# Patient Record
Sex: FEMALE | Race: WHITE | NOT HISPANIC OR LATINO | Employment: PART TIME | URBAN - METROPOLITAN AREA
[De-identification: names, ages, dates, MRNs, and addresses within clinical notes are randomized per-mention and may not be internally consistent; named-entity substitution may affect disease eponyms.]

---

## 2023-11-15 DIAGNOSIS — M25.552 LEFT HIP PAIN: ICD-10-CM

## 2023-12-15 ENCOUNTER — HOSPITAL ENCOUNTER (OUTPATIENT)
Dept: RADIOLOGY | Facility: HOSPITAL | Age: 18
Discharge: HOME | End: 2023-12-15
Payer: COMMERCIAL

## 2023-12-15 DIAGNOSIS — M25.552 LEFT HIP PAIN: ICD-10-CM

## 2023-12-15 PROCEDURE — 73503 X-RAY EXAM HIP UNI 4/> VIEWS: CPT | Mod: LT

## 2023-12-15 PROCEDURE — 73503 X-RAY EXAM HIP UNI 4/> VIEWS: CPT | Mod: LEFT SIDE | Performed by: RADIOLOGY

## 2024-01-18 DIAGNOSIS — M25.859 FEMORAL ACETABULAR IMPINGEMENT: ICD-10-CM

## 2024-01-22 ENCOUNTER — APPOINTMENT (OUTPATIENT)
Dept: RADIOLOGY | Facility: HOSPITAL | Age: 19
End: 2024-01-22
Payer: COMMERCIAL

## 2024-01-22 ENCOUNTER — HOSPITAL ENCOUNTER (OUTPATIENT)
Dept: RADIOLOGY | Facility: CLINIC | Age: 19
Discharge: HOME | End: 2024-01-22
Payer: COMMERCIAL

## 2024-01-22 DIAGNOSIS — M25.859 FEMORAL ACETABULAR IMPINGEMENT: ICD-10-CM

## 2024-01-22 PROCEDURE — 73721 MRI JNT OF LWR EXTRE W/O DYE: CPT | Mod: LT

## 2024-01-22 PROCEDURE — 73721 MRI JNT OF LWR EXTRE W/O DYE: CPT | Mod: LEFT SIDE | Performed by: RADIOLOGY

## 2024-08-05 ENCOUNTER — OFFICE VISIT (OUTPATIENT)
Dept: ORTHOPEDIC SURGERY | Facility: HOSPITAL | Age: 19
End: 2024-08-05
Payer: COMMERCIAL

## 2024-08-05 ENCOUNTER — HOSPITAL ENCOUNTER (OUTPATIENT)
Dept: RADIOLOGY | Facility: HOSPITAL | Age: 19
Discharge: HOME | End: 2024-08-05
Payer: COMMERCIAL

## 2024-08-05 DIAGNOSIS — M25.551 BILATERAL HIP PAIN: ICD-10-CM

## 2024-08-05 DIAGNOSIS — M25.552 PAIN OF LEFT HIP: Primary | ICD-10-CM

## 2024-08-05 DIAGNOSIS — M25.552 BILATERAL HIP PAIN: ICD-10-CM

## 2024-08-05 PROCEDURE — 73523 X-RAY EXAM HIPS BI 5/> VIEWS: CPT | Mod: BILATERAL PROCEDURE | Performed by: RADIOLOGY

## 2024-08-05 PROCEDURE — 99214 OFFICE O/P EST MOD 30 MIN: CPT | Performed by: ORTHOPAEDIC SURGERY

## 2024-08-05 PROCEDURE — 99204 OFFICE O/P NEW MOD 45 MIN: CPT | Performed by: ORTHOPAEDIC SURGERY

## 2024-08-05 PROCEDURE — 73523 X-RAY EXAM HIPS BI 5/> VIEWS: CPT

## 2024-08-05 RX ORDER — ALBUTEROL SULFATE 90 UG/1
2 INHALANT RESPIRATORY (INHALATION) EVERY 6 HOURS PRN
COMMUNITY

## 2024-08-05 RX ORDER — MELOXICAM 15 MG/1
15 TABLET ORAL DAILY
Qty: 14 TABLET | Refills: 0 | Status: SHIPPED | OUTPATIENT
Start: 2024-08-05 | End: 2025-08-05

## 2024-08-05 ASSESSMENT — PAIN - FUNCTIONAL ASSESSMENT: PAIN_FUNCTIONAL_ASSESSMENT: 0-10

## 2024-08-05 ASSESSMENT — PAIN SCALES - GENERAL: PAINLEVEL_OUTOF10: 3

## 2024-08-05 ASSESSMENT — PAIN DESCRIPTION - DESCRIPTORS: DESCRIPTORS: NUMBNESS;ACHING

## 2024-08-05 NOTE — PROGRESS NOTES
Subjective   Justina Vela is a 19 y.o. female [employer]  (senior year) who presents for Pain of the Left Hip    Chronic left hip problem since high school. Left leg is her plant leg. Had treatment with chiropractor. Worse with cutting, internal and external rotation. She reports continue to worsen in which last season toward the end, she notes painful during the game and after just walking.  and physical therapy worked with her with TOBI strengthening and hip mobility for over 1 year.  Have not done any injection into hip.  Taking advil 400mg one to two times a week as needed.  Groin hip pain with c sign radiating down front quad to knees. Worse with cutting, agility, internal/external rotation, prolonged sitting.  Denies injury, trauma, numbness.  Endorses numbness in the front quadricep    She saw Maria De Jesus 11/2023. Diagnosed with TOBI.     Patient is hoping to get through the season (Fall). Start 8/17.    ROS: All pertinent positive symptoms are included in the history of present illness.    All other systems have been reviewed and are negative and noncontributory to this patient's current ailments.    Objective     There were no vitals filed for this visit.    General/Constitutional: No apparent distress. Well-nourished and well developed.  Head: Normocephalic, Atraumatic.   Eyes: EOMI.  Vascular: No edema, swelling or tenderness, except as noted in detailed exam.  Respiratory: Non-labored breathing.  Integumentary: No impressive skin lesions present, except as noted in detailed exam.  Neurological: Alert and oriented.  Psychological:  Normal mood and affect.  Musculoskeletal: Normal, except as noted in detailed exam.    Left HIP EXAM    Inspection:   Normal   Obliquity none   Muscle atrophy none    Range of motion:   Hip flexion supine: (140) full, pain free   Hip extension (prone) (15) full, pain free   Hip abduction (45) full, pain free   Hip adduction (30-45) full, pain free   Hip  IR at 90 flexion (15) full, pain free   Hip ER at 90 Flexion(40-50) full, pain free     Palpation:   TTP ASIS none  TTP AIIS none  TTP Greater trochanter none  TTP Ischial tuberosities none  TTP Iliac crest none  TTP Femur none  TTP Anterior hip joint line pain on L    TTP Flexor tendons none  TTP Gluteus medius tendon none  TTP Tensor fascia meche none  TTP Adductors none  TTP Quadriceps none  TTP Hamstring none  TTP Piriformis none  TTP Gluteus musculature none    TTP SI joint none  TTP Midline lumbar spine none  TTP Lumbar paraspinal muscles none    Special Tests:  CORA (psoas impingement): negative  Internal impingement: FADIR: positive  Log roll: negative  Resisted straight leg raise: no pain    Strength test:   Seated hip flexion pain free, 5/5  Extension pain free, 5/5  Abduction pain free, 5/5  Adduction pain free, 5/5  Side-lying hip abduction pain free, 5/5  Supine resisted straight leg raise pain free, 5/5    Gait normal     Imagin2024 bilateral hip reveal no acute fracture or dislocation. Cam deformity with alpha angle of 68 bilaterally. Left hip inclination 4.7, LCA 27, mild posterior and lateral uncoverage   2024 MRI left hip: Anterior acetabular labral tear. Small amount of gluteus medius and left hamstring origin tendinosis    === 12/15/23 ===  XR HIP LEFT WITH PELVIS WHEN PERFORMED 4+ VIEWS  - Impression -  Cam morphology of the left femoral neck.  Signed by: Declan Murdock 2023 9:01 AM    Assessment/Plan   Problem List Items Addressed This Visit    None  Visit Diagnoses       Pain of left hip    -  Primary    Relevant Medications    meloxicam (Mobic) 15 mg tablet    Other Relevant Orders    XR hips bilateral 5+ VW w pelvis when performed          Justina Vela is a 19 y.o. 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 Patient reports continued worsening (cutting, agility, internal/external rotation, prolonged sitting) of her left hip pain  despite working with athletic trainers on strengthening and mobility.  Physical exam revealed positive FADIR and pain to palpation anterior hip joint line.  Imaging x-ray reveal bilateral cam deformity and MRI revealing anterior acetabular labral tear.  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    Discussed and educated regarding TOBI diagnosis.  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  With shared decision making,  - Meloxicam once daily for 14 days  - Continue physical therapy and activity modifications  - Discussed with [facility] team physicians (Dr. Maharaj) to coordinate L intra-articular hip injection when needed  - If continue to worsen/persist, consider MRI for R hip arthroscopy     Follow up as needed.

## 2024-08-26 ENCOUNTER — APPOINTMENT (OUTPATIENT)
Dept: ORTHOPEDIC SURGERY | Facility: CLINIC | Age: 19
End: 2024-08-26
Payer: COMMERCIAL

## 2024-08-26 ENCOUNTER — HOSPITAL ENCOUNTER (OUTPATIENT)
Dept: RADIOLOGY | Facility: EXTERNAL LOCATION | Age: 19
Discharge: HOME | End: 2024-08-26

## 2024-08-26 DIAGNOSIS — M25.552 PAIN OF LEFT HIP: ICD-10-CM

## 2024-08-26 DIAGNOSIS — M25.551 RIGHT HIP PAIN: Primary | ICD-10-CM

## 2024-08-26 PROCEDURE — 99213 OFFICE O/P EST LOW 20 MIN: CPT | Performed by: FAMILY MEDICINE

## 2024-08-26 PROCEDURE — 20611 DRAIN/INJ JOINT/BURSA W/US: CPT | Performed by: STUDENT IN AN ORGANIZED HEALTH CARE EDUCATION/TRAINING PROGRAM

## 2024-08-26 PROCEDURE — 1036F TOBACCO NON-USER: CPT | Performed by: FAMILY MEDICINE

## 2024-08-26 ASSESSMENT — PAIN - FUNCTIONAL ASSESSMENT: PAIN_FUNCTIONAL_ASSESSMENT: 0-10

## 2024-08-26 ASSESSMENT — PAIN DESCRIPTION - DESCRIPTORS: DESCRIPTORS: ACHING;SHARP

## 2024-08-26 NOTE — PROGRESS NOTES
Subjective   Justina Vela is a 19 y.o. female [employer] Ruff[employer]  (senior year) who presents for Pain of the Left Hip and Pain of the Right Hip    8/5 Visit : Chronic left hip problem since high school. Left leg is her plant leg. Had treatment with chiropractor. Worse with cutting, internal and external rotation. She reports continue to worsen in which last season toward the end, she notes painful during the game and after just walking.  and physical therapy worked with her with TOBI strengthening and hip mobility for over 1 year.  Have not done any injection into hip.  Taking advil 400mg one to two times a week as needed.  Groin hip pain with c sign radiating down front quad to knees. Worse with cutting, agility, internal/external rotation, prolonged sitting.  Denies injury, trauma, numbness.  Endorses numbness in the front quadricep  She saw Dr. Yoo 11/2023. Diagnosed with TOBI.   Patient is hoping to get through the season (Fall). Start 8/17.  ROS: All pertinent positive symptoms are included in the history of present illness.  All other systems have been reviewed and are negative and noncontributory to this patient's current ailments.    8/26 - Return to clinic for intraarticular injections of both hips. Pain L>R, no new injuries/events, has been compensating with R leg some and requesting since she is here and having likely TOBI pain bilaterally.    Objective     There were no vitals filed for this visit.    General/Constitutional: No apparent distress. Well-nourished and well developed.  Head: Normocephalic, Atraumatic.   Eyes: EOMI.  Vascular: No edema, swelling or tenderness, except as noted in detailed exam.  Respiratory: Non-labored breathing.  Integumentary: No impressive skin lesions present, except as noted in detailed exam.  Neurological: Alert and oriented.  Psychological:  Normal mood and affect.  Musculoskeletal: Normal, except as noted in detailed exam.    Left HIP  EXAM  Inspection:   Normal   Obliquity none   Muscle atrophy none    Range of motion:   Hip flexion supine: (140) full, pain free   Hip extension (prone) (15) full, pain free   Hip abduction (45) full, pain free   Hip adduction (30-45) full, pain free   Hip IR at 90 flexion (15) full, pain free   Hip ER at 90 Flexion(40-50) full, pain free     Palpation:   TTP ASIS none  TTP AIIS none  TTP Greater trochanter none  TTP Ischial tuberosities none  TTP Iliac crest none  TTP Femur none  TTP Anterior hip joint line pain on L    TTP Flexor tendons none  TTP Gluteus medius tendon none  TTP Tensor fascia meche none  TTP Adductors none  TTP Quadriceps none  TTP Hamstring none  TTP Piriformis none  TTP Gluteus musculature none    TTP SI joint none  TTP Midline lumbar spine none  TTP Lumbar paraspinal muscles none    Special Tests:  CORA (psoas impingement): negative  Internal impingement: FADIR: positive  Log roll: negative  Resisted straight leg raise: no pain    Strength test:   Seated hip flexion pain free, 5/5  Extension pain free, 5/5  Abduction pain free, 5/5  Adduction pain free, 5/5  Side-lying hip abduction pain free, 5/5  Supine resisted straight leg raise pain free, 5/5    Gait normal     Imagin2024 bilateral hip reveal no acute fracture or dislocation. Cam deformity with alpha angle of 68 bilaterally. Left hip inclination 4.7, LCA 27, mild posterior and lateral uncoverage   2024 MRI left hip: Anterior acetabular labral tear. Small amount of gluteus medius and left hamstring origin tendinosis    === 12/15/23 ===  XR HIP LEFT WITH PELVIS WHEN PERFORMED 4+ VIEWS  - Impression -  Cam morphology of the left femoral neck.  Signed by: Declan Murdock 2023 9:01 AM    PROCEDURES  L Inj/Asp: bilateral hip joint on 2024 3:58 PM  Indications: pain  Details: 20 G needle, ultrasound-guided anterior approach  Medications (Right): 80 mg methylPREDNISolone acetate 40 mg/mL; 2 mL lidocaine 10 mg/mL (1  %)  Medications (Left): 2 mL lidocaine 10 mg/mL (1 %); 80 mg methylPREDNISolone acetate 40 mg/mL  Outcome: tolerated well, no immediate complications  Procedure, treatment alternatives, risks and benefits explained, specific risks discussed. Consent was given by the patient. Immediately prior to procedure a time out was called to verify the correct patient, procedure, equipment, support staff and site/side marked as required. Patient was prepped and draped in the usual sterile fashion.            Assessment/Plan   Problem List Items Addressed This Visit    None  Visit Diagnoses       Right hip pain    -  Primary    Relevant Orders    L Inj/Asp: bilateral hip joint    Pain of left hip        Relevant Orders    Point of Care Ultrasound (Completed)    L Inj/Asp: bilateral hip joint          Justina Vela is a 19 y.o. 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 Patient reports continued worsening (cutting, agility, internal/external rotation, prolonged sitting) of her left hip pain despite working with athletic trainers on strengthening and mobility.  Physical exam revealed positive FADIR and pain to palpation anterior hip joint line.  Imaging x-ray reveal bilateral cam deformity and MRI revealing anterior acetabular labral tear.  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    Today's plan: bilateral CSI injection for symptomatic relief to get through senior season.  Follow up as needed.  If continue to worsen/persist, consider MRI for R hip arthroscopy     All questions answered and patient is agreeable to plan of care.    Diony Taylor MD PGY-4  Primary Care Sports Medicine Fellow  [facility] [place]  [facility]

## 2024-08-27 RX ORDER — LIDOCAINE HYDROCHLORIDE 10 MG/ML
2 INJECTION INFILTRATION; PERINEURAL
Status: COMPLETED | OUTPATIENT
Start: 2024-08-26 | End: 2024-08-26

## 2024-08-27 RX ORDER — METHYLPREDNISOLONE ACETATE 40 MG/ML
80 INJECTION, SUSPENSION INTRA-ARTICULAR; INTRALESIONAL; INTRAMUSCULAR; SOFT TISSUE
Status: COMPLETED | OUTPATIENT
Start: 2024-08-26 | End: 2024-08-26

## 2024-09-16 DIAGNOSIS — M25.572 LEFT ANKLE PAIN, UNSPECIFIED CHRONICITY: ICD-10-CM

## 2024-09-17 ENCOUNTER — HOSPITAL ENCOUNTER (OUTPATIENT)
Dept: RADIOLOGY | Facility: CLINIC | Age: 19
Discharge: HOME | End: 2024-09-17
Payer: COMMERCIAL

## 2024-09-17 DIAGNOSIS — M25.572 LEFT ANKLE PAIN, UNSPECIFIED CHRONICITY: ICD-10-CM

## 2024-09-17 PROCEDURE — 73610 X-RAY EXAM OF ANKLE: CPT | Mod: LEFT SIDE | Performed by: RADIOLOGY

## 2024-09-17 PROCEDURE — 73610 X-RAY EXAM OF ANKLE: CPT | Mod: LT

## 2024-10-30 ENCOUNTER — OFFICE VISIT (OUTPATIENT)
Dept: ORTHOPEDIC SURGERY | Facility: HOSPITAL | Age: 19
End: 2024-10-30
Payer: COMMERCIAL

## 2024-10-30 DIAGNOSIS — Q65.89 HIP DYSPLASIA (HHS-HCC): Primary | ICD-10-CM

## 2024-10-30 PROCEDURE — 99213 OFFICE O/P EST LOW 20 MIN: CPT | Performed by: ORTHOPAEDIC SURGERY

## 2024-11-06 ENCOUNTER — HOSPITAL ENCOUNTER (OUTPATIENT)
Dept: RADIOLOGY | Facility: HOSPITAL | Age: 19
Discharge: HOME | End: 2024-11-06
Payer: COMMERCIAL

## 2024-11-06 DIAGNOSIS — Q65.89 HIP DYSPLASIA (HHS-HCC): ICD-10-CM

## 2024-11-06 PROCEDURE — 72192 CT PELVIS W/O DYE: CPT

## 2024-11-13 ENCOUNTER — APPOINTMENT (OUTPATIENT)
Dept: RADIOLOGY | Facility: CLINIC | Age: 19
End: 2024-11-13
Payer: COMMERCIAL

## 2024-11-20 ENCOUNTER — OFFICE VISIT (OUTPATIENT)
Dept: ORTHOPEDIC SURGERY | Facility: HOSPITAL | Age: 19
End: 2024-11-20
Payer: COMMERCIAL

## 2024-11-20 ENCOUNTER — OFFICE VISIT (OUTPATIENT)
Dept: ORTHOPEDIC SURGERY | Facility: CLINIC | Age: 19
End: 2024-11-20
Payer: COMMERCIAL

## 2024-11-20 DIAGNOSIS — Q65.89 HIP DYSPLASIA (HHS-HCC): Primary | ICD-10-CM

## 2024-11-20 PROCEDURE — 99213 OFFICE O/P EST LOW 20 MIN: CPT | Performed by: ORTHOPAEDIC SURGERY

## 2024-11-20 PROCEDURE — 99215 OFFICE O/P EST HI 40 MIN: CPT | Performed by: ORTHOPAEDIC SURGERY

## 2024-11-20 PROCEDURE — 1036F TOBACCO NON-USER: CPT | Performed by: ORTHOPAEDIC SURGERY

## 2024-11-20 ASSESSMENT — PAIN - FUNCTIONAL ASSESSMENT: PAIN_FUNCTIONAL_ASSESSMENT: NO/DENIES PAIN

## 2024-11-20 NOTE — PROGRESS NOTES
This is a consultation from Dr. Hansen.  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  She has had left hip pain now for several months up to a year.  She has been playing through it over the past season.  Her pain is worse activity and better with rest and now is even present with just mild activity.  She is done physical therapy and training with her  without relief of her symptoms.  She notes mostly anterior and anterolateral pain.    The patients full medical history, surgical history, medications, allergies, family, medical history, social history, and a complete 30 point review of systems is documented in the medical record on the signed, scanned medical intake sheet or reviewed in the history of present illness.    Gen: The patient is alert and oriented ×3, is in no acute distress, and appear their stated age and weight.    Psychiatric: Mood and affect are appropriate.    Eyes: Sclera are white, and pupils are round and symmetric.    ENT: Mucous membranes are moist.     Neck: Supple. Thyroid is midline.    Respiratory: Respirations are nonlabored, chest rise is symmetric.    Cardiac: Rate is regular by palpation of distal pulses.     Abdomen: Nondistended.    Integument: No obvious cutaneous lesions are noted. No signs of lymphangitis. No signs of systemic edema.    Gait and stance examination demonstrate a reciprocal heel toe gait. Trendelenburg sign is negative.    Left/right hip examination reveals 120 degrees of flexion, 30 degrees of internal rotation, 50 degrees of external rotation, and 50 degrees of abduction. Anterior impingement sign is positive.  Posterior impingement sign is negative. Subspine impingement sign is negative. CORA test is negative. Stinchfield test is positive. Ramírez test is negative. There is no tenderness to palpation over the pubic symphysis, anterior groin, greater trochanter, or gluteal  region. Posterior apprehension is positive. Anterior apprehension is negative. Hip flexion strength is 5 out of 5. Adductor strength is 5 out of 5. Abduction strength is 5 out of 5 in the lateral position. Pelvic obliquity is level.    Distally, ankle dorsiflexion and plantarflexion as well as great toe extension is 5 out of 5. Sensation is intact to light touch in the tibial, sural, saphenous, superficial peroneal, and deep peroneal nerve distributions. The foot is warm and well-perfused with palpable pedal pulses. There is no obvious edema present. Skin is warm, dry and intact.    Multiple views of her hip and pelvis demonstrate a 20 degree lateral center edge angle on a flat acetabular index.  MRI demonstrates labral tear.    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  She would be a good candidate for combined ROLF and hip arthroscopy.  She would like to move forward with surgery in the spring.  Will get her scheduled for 3/10.  We had a detailed discussion regarding the full details of the procedure, the proposed surgical plan, the cutting of the acetabular socket free from the intact pelvis and reorientation. We discussed combined arthroscopic and open approach with Dr. Hansen for decompression of her femoral head with osteochondroplasty and repair of her labrum. We discussed regional anesthesia block combined with general anesthesia. We discussed use of hypotensive anesthesia to minimize blood loss but that blood allogenic blood transfusion is still a possibility. We discussed 3-4 day inpatient hospital stay. Although a traditional hip arthroscopy would be done outpatient, the complex level of hospital and nursing care needed would necessitate an inpatient stay, including frequent vital checks, monitoring and treatment of hypotension and anemia, IV pain medication, medical co-management, and safety education of ADLs while on crutches with limited weight bearing.  We  also discussed 30 pounds foot flat weightbearing on the operative extremity with crutch use for 6 weeks. We discussed that patient will need a wheelchair for short distances possibly for up to 3 months and long distances up to 6 months depending on fatigue and recovery level. The natural history of dysplasia as well as details surrounding the procedure and postoperative recovery course were discussed with the patient and family present.  Nonoperative and operative treatment options were presented to the patient. After discussion, operative treatment was elected. Risks and benefits of surgery were discussed with the patient which include, but are not limited to, death, infection, bleeding, neurologic damage, nonunion, malunion, posttraumatic arthritis, incomplete resolution of symptoms, failure of the operation, and others. The patient understood and elected to proceed.    Natural History reviewed. All questions answered. The patient was in agreement with the plan.      **This note was created using voice recognition software and was not corrected for typographical or grammatical errors.**

## 2024-11-20 NOTE — PROGRESS NOTES
Patient returns to see me today she has known femoral acetabular impingement labral pathology as well as hip dysplasia.  She saw Dr. Brooks today who agrees with this diagnosis and she is interested in proceeding with operative intervention in the form of a combined hip arthroscopy and periacetabular osteotomy.    Patient would like to proceed with operative intervention in the form of a combined hip arthroscopy and periacetabular osteotomy.     The arthroscopic surgery component would comprise a arthroscopy with labral repair loose body removal rim trim subspinous decompression and femoral osteochondroplasty with capsular plication for her instability from a ligamentous laxity standpoint.     Risks of surgery which include but are not limited to bleeding, infection, damage to nerves, damage to blood vessels, blood clots, heterotopic ossification, avascular necrosis, progression to hip replacement, pudendal nerve palsy, iatrogenic instability, incomplete pain relief, capsular labral adhesions, decreased range of motion, risks of anesthesia including heart attack stroke and death were explained to the patient.  They expressed understanding of these risks and wished to proceed with operative intervention.     Patient has significant groin pain and catching that limits ADLs - Yes    Pain worsened with prolonged sitting - yes    Minimum of 3 months of treatment/therapeutic exercises -  Yes    Intra-articular injection with temporary relief - No    Positive impingement sign - Yes    X-ray confirming femoral acetabular impingement with elevated alpha angle - yes, alpha angle 65    Growth plates closed - Yes    Imaging confirming acetabular dysplasia - Yes - acetabular index 10, LCEA 22, posterior uncoverage    MRI confirming labral tearing - yes    Tonnis grade - 0

## 2024-11-21 DIAGNOSIS — M25.052 HEMARTHROSIS OF LEFT HIP: ICD-10-CM

## 2024-11-21 DIAGNOSIS — S73.192A TEAR OF ACETABULAR LABRUM, LEFT, INITIAL ENCOUNTER: ICD-10-CM

## 2024-11-21 DIAGNOSIS — M25.852 FEMOROACETABULAR IMPINGEMENT OF LEFT HIP: ICD-10-CM

## 2024-11-21 DIAGNOSIS — Q65.89 CONGENITAL HIP DYSPLASIA (HHS-HCC): Primary | ICD-10-CM

## 2024-12-04 DIAGNOSIS — Q65.89 HIP DYSPLASIA (HHS-HCC): ICD-10-CM

## 2024-12-04 RX ORDER — MELOXICAM 15 MG/1
15 TABLET ORAL DAILY
Qty: 14 TABLET | Refills: 0 | Status: SHIPPED | OUTPATIENT
Start: 2024-12-04 | End: 2024-12-18

## 2024-12-17 DIAGNOSIS — Q65.89 HIP DYSPLASIA (HHS-HCC): Primary | ICD-10-CM

## 2024-12-17 RX ORDER — MELOXICAM 15 MG/1
15 TABLET ORAL DAILY
Qty: 30 TABLET | Refills: 0 | Status: SHIPPED | OUTPATIENT
Start: 2024-12-17 | End: 2025-01-16

## 2024-12-18 ENCOUNTER — APPOINTMENT (OUTPATIENT)
Dept: ORTHOPEDIC SURGERY | Facility: HOSPITAL | Age: 19
End: 2024-12-18
Payer: COMMERCIAL

## 2025-02-27 ENCOUNTER — PRE-ADMISSION TESTING (OUTPATIENT)
Dept: PREADMISSION TESTING | Facility: HOSPITAL | Age: 20
End: 2025-02-27
Payer: COMMERCIAL

## 2025-02-27 ENCOUNTER — LAB (OUTPATIENT)
Dept: LAB | Facility: HOSPITAL | Age: 20
End: 2025-02-27
Payer: COMMERCIAL

## 2025-02-27 VITALS
SYSTOLIC BLOOD PRESSURE: 116 MMHG | WEIGHT: 145 LBS | DIASTOLIC BLOOD PRESSURE: 76 MMHG | HEART RATE: 67 BPM | BODY MASS INDEX: 23.3 KG/M2 | TEMPERATURE: 98.5 F | OXYGEN SATURATION: 99 % | HEIGHT: 66 IN

## 2025-02-27 DIAGNOSIS — Z01.818 PREOP TESTING: Primary | ICD-10-CM

## 2025-02-27 DIAGNOSIS — Z01.818 ENCOUNTER FOR OTHER PREPROCEDURAL EXAMINATION: Primary | ICD-10-CM

## 2025-02-27 DIAGNOSIS — Q65.89 CONGENITAL HIP DYSPLASIA (HHS-HCC): ICD-10-CM

## 2025-02-27 LAB
ABO GROUP (TYPE) IN BLOOD: NORMAL
ABO GROUP (TYPE) IN BLOOD: NORMAL
ANION GAP SERPL CALC-SCNC: 12 MMOL/L (ref 10–20)
ANTIBODY SCREEN: NORMAL
BUN SERPL-MCNC: 12 MG/DL (ref 6–23)
CALCIUM SERPL-MCNC: 9.2 MG/DL (ref 8.6–10.3)
CHLORIDE SERPL-SCNC: 103 MMOL/L (ref 98–107)
CO2 SERPL-SCNC: 30 MMOL/L (ref 21–32)
CREAT SERPL-MCNC: 0.83 MG/DL (ref 0.5–1.05)
EGFRCR SERPLBLD CKD-EPI 2021: >90 ML/MIN/1.73M*2
ERYTHROCYTE [DISTWIDTH] IN BLOOD BY AUTOMATED COUNT: 12.2 % (ref 11.5–14.5)
GLUCOSE SERPL-MCNC: 88 MG/DL (ref 74–99)
HCT VFR BLD AUTO: 38.1 % (ref 36–46)
HGB BLD-MCNC: 12.9 G/DL (ref 12–16)
MCH RBC QN AUTO: 30.4 PG (ref 26–34)
MCHC RBC AUTO-ENTMCNC: 33.9 G/DL (ref 32–36)
MCV RBC AUTO: 90 FL (ref 80–100)
NRBC BLD-RTO: NORMAL /100{WBCS}
PLATELET # BLD AUTO: 217 X10*3/UL (ref 150–450)
POTASSIUM SERPL-SCNC: 4.1 MMOL/L (ref 3.5–5.3)
RBC # BLD AUTO: 4.24 X10*6/UL (ref 4–5.2)
RH FACTOR (ANTIGEN D): NORMAL
RH FACTOR (ANTIGEN D): NORMAL
SODIUM SERPL-SCNC: 141 MMOL/L (ref 136–145)
WBC # BLD AUTO: 8.7 X10*3/UL (ref 4.4–11.3)

## 2025-02-27 PROCEDURE — 85027 COMPLETE CBC AUTOMATED: CPT

## 2025-02-27 PROCEDURE — 86901 BLOOD TYPING SEROLOGIC RH(D): CPT

## 2025-02-27 PROCEDURE — 99203 OFFICE O/P NEW LOW 30 MIN: CPT | Performed by: PHYSICIAN ASSISTANT

## 2025-02-27 PROCEDURE — 80048 BASIC METABOLIC PNL TOTAL CA: CPT

## 2025-02-27 PROCEDURE — 86900 BLOOD TYPING SEROLOGIC ABO: CPT

## 2025-02-27 PROCEDURE — 87081 CULTURE SCREEN ONLY: CPT | Mod: BEALAB

## 2025-02-27 PROCEDURE — 86850 RBC ANTIBODY SCREEN: CPT

## 2025-02-27 RX ORDER — IBUPROFEN 200 MG
200 TABLET ORAL EVERY 6 HOURS
COMMUNITY

## 2025-02-27 RX ORDER — CHLORHEXIDINE GLUCONATE ORAL RINSE 1.2 MG/ML
SOLUTION DENTAL
Qty: 473 ML | Refills: 0 | Status: SHIPPED | OUTPATIENT
Start: 2025-02-27

## 2025-02-27 ASSESSMENT — DUKE ACTIVITY SCORE INDEX (DASI)
TOTAL_SCORE: 58.2
CAN YOU DO LIGHT WORK AROUND THE HOUSE LIKE DUSTING OR WASHING DISHES: YES
CAN YOU TAKE CARE OF YOURSELF (EAT, DRESS, BATHE, OR USE TOILET): YES
DASI METS SCORE: 9.9
CAN YOU WALK A BLOCK OR TWO ON LEVEL GROUND: YES
CAN YOU RUN A SHORT DISTANCE: YES
CAN YOU DO MODERATE WORK AROUND THE HOUSE LIKE VACUUMING, SWEEPING FLOORS OR CARRYING GROCERIES: YES
CAN YOU DO YARD WORK LIKE RAKING LEAVES, WEEDING OR PUSHING A MOWER: YES
CAN YOU PARTICIPATE IN MODERATE RECREATIONAL ACTIVITIES LIKE GOLF, BOWLING, DANCING, DOUBLES TENNIS OR THROWING A BASEBALL OR FOOTBALL: YES
CAN YOU DO HEAVY WORK AROUND THE HOUSE LIKE SCRUBBING FLOORS OR LIFTING AND MOVING HEAVY FURNITURE: YES
CAN YOU CLIMB A FLIGHT OF STAIRS OR WALK UP A HILL: YES
CAN YOU PARTICIPATE IN STRENOUS SPORTS LIKE SWIMMING, SINGLES TENNIS, FOOTBALL, BASKETBALL, OR SKIING: YES
CAN YOU WALK INDOORS, SUCH AS AROUND YOUR HOUSE: YES
CAN YOU HAVE SEXUAL RELATIONS: YES

## 2025-02-27 ASSESSMENT — ENCOUNTER SYMPTOMS
NEUROLOGICAL NEGATIVE: 1
NECK NEGATIVE: 1
ENDOCRINE NEGATIVE: 1
EYES NEGATIVE: 1
GASTROINTESTINAL NEGATIVE: 1
CONSTITUTIONAL NEGATIVE: 1
RESPIRATORY NEGATIVE: 1
CARDIOVASCULAR NEGATIVE: 1

## 2025-02-27 ASSESSMENT — LIFESTYLE VARIABLES: SMOKING_STATUS: NONSMOKER

## 2025-02-27 ASSESSMENT — PAIN - FUNCTIONAL ASSESSMENT: PAIN_FUNCTIONAL_ASSESSMENT: 0-10

## 2025-02-27 NOTE — CPM/PAT H&P
CPM/PAT Evaluation       Name: Justina Vela (Justina Vela)  /Age: 2005/19 y.o.     Visit Type:   In-Person       Chief Complaint: left hip pain    HPI: Patient is a 20 yo F scheduled for left ROLF on 3/10/2025 with Dr. Brooks and Dr. Hansen secondary to congenital hip dysplasia, tear of left acetabular labrum, femoroacetabular impingement of left hip, hemarthrosis of left .  Patient was referred by Dr. Brooks and Dr. Hansen to CPM today for perioperative risk stratification and optimization.       History reviewed. No pertinent past medical history.    Past Surgical History:   Procedure Laterality Date    ADENOIDECTOMY  2015    Adenoidectomy    UPPER GASTROINTESTINAL ENDOSCOPY      WISDOM TOOTH EXTRACTION         Patient Sexual activity questions deferred to the physician.    Family History   Problem Relation Name Age of Onset    Thyroid cancer Maternal Grandmother      Heart disease Maternal Grandfather      Transient ischemic attack Maternal Grandfather      Other (great aunt with ovarian cancer) Other         No Known Allergies    Prior to Admission medications    Medication Sig Start Date End Date Taking? Authorizing Provider   albuterol 90 mcg/actuation inhaler Inhale 2 puffs every 6 hours if needed for wheezing. For season allergies that cause flare ups   Yes Historical Provider, MD   ibuprofen 200 mg tablet Take 1 tablet (200 mg) by mouth every 6 hours.   Yes Historical Provider, MD   meloxicam (Mobic) 15 mg tablet Take 1 tablet (15 mg) by mouth once daily.  Patient not taking: Reported on 2025  Silas Hansen MD        PAT ROS:   Constitutional:   neg    Neuro/Psych:   neg    Eyes:   neg    Ears:   neg    Nose:   neg    Mouth:   neg    Throat:   neg    Neck:   neg    Cardio:   neg    Respiratory:   neg    Endocrine:   neg    GI:   neg    :   neg    Musculoskeletal:    +left hip pain  Hematologic:   neg    Skin:  neg        Physical Exam  Vitals and nursing note reviewed.  "  Constitutional:       General: She is not in acute distress.     Appearance: She is not ill-appearing or toxic-appearing.   HENT:      Head: Normocephalic and atraumatic.      Nose: Nose normal.      Mouth/Throat:      Mouth: Mucous membranes are moist.   Eyes:      Extraocular Movements: Extraocular movements intact.      Conjunctiva/sclera: Conjunctivae normal.   Neck:      Vascular: No carotid bruit.   Cardiovascular:      Rate and Rhythm: Normal rate and regular rhythm.      Pulses: Normal pulses.      Heart sounds: Normal heart sounds. No murmur heard.  Pulmonary:      Effort: Pulmonary effort is normal.      Breath sounds: Normal breath sounds.   Abdominal:      General: There is no distension.      Palpations: Abdomen is soft.      Tenderness: There is no abdominal tenderness.   Musculoskeletal:         General: Normal range of motion.      Cervical back: Normal range of motion.   Skin:     General: Skin is warm and dry.      Capillary Refill: Capillary refill takes less than 2 seconds.   Neurological:      General: No focal deficit present.      Mental Status: She is alert.   Psychiatric:         Mood and Affect: Mood normal.         Behavior: Behavior normal.          PAT AIRWAY:   Airway:     Mallampati::  I    TM distance::  >3 FB    Neck ROM::  Full      Visit Vitals  /76   Pulse 67   Temp 36.9 °C (98.5 °F) (Temporal)   Ht 1.676 m (5' 6\")   Wt 65.8 kg (145 lb)   SpO2 99%   BMI 23.40 kg/m²   Smoking Status Never   BSA 1.75 m²       DASI Risk Score      Flowsheet Row Pre-Admission Testing from 2/27/2025 in [facility]   Can you take care of yourself (eat, dress, bathe, or use toilet)?  2.75 filed at 02/27/2025 1528   Can you walk indoors, such as around your house? 1.75 filed at 02/27/2025 1528   Can you walk a block or two on level ground?  2.75 filed at 02/27/2025 1528   Can you climb a flight of stairs or walk up a hill? 5.5 filed at 02/27/2025 1528   Can you run a short " distance? 8 filed at 02/27/2025 1528   Can you do light work around the house like dusting or washing dishes? 2.7 filed at 02/27/2025 1528   Can you do moderate work around the house like vacuuming, sweeping floors or carrying groceries? 3.5 filed at 02/27/2025 1528   Can you do heavy work around the house like scrubbing floors or lifting and moving heavy furniture?  8 filed at 02/27/2025 1528   Can you do yard work like raking leaves, weeding or pushing a mower? 4.5 filed at 02/27/2025 1528   Can you have sexual relations? 5.25 filed at 02/27/2025 1528   Can you participate in moderate recreational activities like golf, bowling, dancing, doubles tennis or throwing a baseball or football? 6 filed at 02/27/2025 1528   Can you participate in strenous sports like swimming, singles tennis, football, basketball, or skiing? 7.5 filed at 02/27/2025 1528   DASI SCORE 58.2 filed at 02/27/2025 1528   METS Score (Will be calculated only when all the questions are answered) 9.9 filed at 02/27/2025 1528          Caprini DVT Assessment      Flowsheet Row Pre-Admission Testing from 2/27/2025 in [facility]   DVT Score (IF A SCORE IS NOT CALCULATING, MUST SELECT A BMI TO COMPLETE) 6 filed at 02/27/2025 1528   Surgical Factors Major surgery planned, lasting over 3 hours filed at 02/27/2025 1528   BMI (BMI MUST BE CHOSEN) 30 or less filed at 02/27/2025 1528          Modified Frailty Index      Flowsheet Row Pre-Admission Testing from 2/27/2025 in [facility]   Non-independent functional status (problems with dressing, bathing, personal grooming, or cooking) 0 filed at 02/27/2025 1529   History of diabetes mellitus  0 filed at 02/27/2025 1529   History of COPD 0 filed at 02/27/2025 1529   History of CHF No filed at 02/27/2025 1529   History of MI 0 filed at 02/27/2025 1529   History of Percutaneous Coronary Intervention, Cardiac Surgery, or Angina No filed at 02/27/2025 1529   Hypertension requiring the  use of medication  0 filed at 02/27/2025 1529   Peripheral vascular disease 0 filed at 02/27/2025 1529   Impaired sensorium (cognitive impairement or loss, clouding, or delirium) 0 filed at 02/27/2025 1529   TIA or CVA withouy residual deficit 0 filed at 02/27/2025 1529   Cerebrovascular accident with deficit 0 filed at 02/27/2025 1529   Modified Frailty Index Calculator 0 filed at 02/27/2025 1529          [id number] Stroke Risk Points  Current as of 33 minutes ago        N/A 0 to 9 Points:      Last Change: N/A          The [id number] risk score (Lip DEEDEE, et al. 2009. © 2010 [employer]) quantifies the risk of stroke for a patient with atrial fibrillation. For patients without atrial fibrillation or under the age of 18 this score appears as N/A. Higher score values generally indicate higher risk of stroke.        This score is not applicable to this patient. Components are not calculated.          Revised Cardiac Risk Index      Flowsheet Row Pre-Admission Testing from 2/27/2025 in [facility]   High-Risk Surgery (Intraperitoneal, Intrathoracic,Suprainguinal vascular) 0 filed at 02/27/2025 1528   History of ischemic heart disease (History of MI, History of positive exercuse test, Current chest paint considered due to myocardial ischemia, Use of nitrate therapy, ECG with pathological Q Waves) 0 filed at 02/27/2025 1528   History of congestive heart failure (pulmonary edemia, bilateral rales or S3 gallop, Paroxysmal nocturnal dyspnea, CXR showing pulmonary vascular redistribution) 0 filed at 02/27/2025 1528   History of cerebrovascular disease (Prior TIA or stroke) 0 filed at 02/27/2025 1528   Pre-operative insulin treatment 0 filed at 02/27/2025 1528   Pre-operative creatinine>2 mg/dl 0 filed at 02/27/2025 1528   Revised Cardiac Risk Calculator 0 filed at 02/27/2025 1528          Apfel Simplified Score      Flowsheet Row Pre-Admission Testing from 2/27/2025 in [facility]   Smoking status 1 filed at 02/27/2025 1528   History of motion sickness or PONV  0 filed at 02/27/2025 1528   Use of postoperative opioids 0 filed at 02/27/2025 1528   Gender - Female 1=Yes filed at 02/27/2025 1528   Apfel Simplified Score Calculator 2 filed at 02/27/2025 1528          Risk Analysis Index Results This Encounter    No data found in the last 10 encounters.       Stop Bang Score      Flowsheet Row Pre-Admission Testing from 2/27/2025 in [facility]   Do you snore loudly? 0 filed at 02/27/2025 1512   Do you often feel tired or fatigued after your sleep? 0 filed at 02/27/2025 1512   Has anyone ever observed you stop breathing in your sleep? 0 filed at 02/27/2025 1512   Do you have or are you being treated for high blood pressure? 0 filed at 02/27/2025 1512   Recent BMI (Calculated) 23.4 filed at 02/27/2025 1512   Is BMI greater than 35 kg/m2? 0=No filed at 02/27/2025 1512   Age older than 50 years old? 0=No filed at 02/27/2025 1512   Is your neck circumference greater than 17 inches (Male) or 16 inches (Female)? 0 filed at 02/27/2025 1512   Gender - Male 0=No filed at 02/27/2025 1512   STOP-BANG Total Score 0 filed at 02/27/2025 1512          Prodigy: High Risk  Total Score: 0          ARISCAT Score for Postoperative Pulmonary Complications      Flowsheet Row Pre-Admission Testing from 2/27/2025 in [facility]   Age Calculated Score 0 filed at 02/27/2025 1529   Preoperative SpO2 0 filed at 02/27/2025 1529   Respiratory infection in the last month Either upper or lower (i.e., URI, bronchitis, pneumonia), with fever and antibiotic treatment 0 filed at 02/27/2025 1529   Preoperative anemia (Hgb less than 10 g/dl) 0 filed at 02/27/2025 1529   Surgical incision  0 filed at 02/27/2025 1529   Duration of surgery  23 filed at 02/27/2025 1529   Emergency Procedure  0 filed at 02/27/2025 1529   ARISCAT Total Score  23 filed at 02/27/2025 1529          Saumya  Perioperative Risk for Myocardial Infarction or Cardiac Arrest (FABIOLA)      Flowsheet Row Pre-Admission Testing from 2/27/2025 in [facility]   Calculated Age Score 0.42 filed at 02/27/2025 1533   Functional Status  0 filed at 02/27/2025 1533   ASA Class  -5.17 filed at 02/27/2025 1533   Creatinine 0 filed at 02/27/2025 1533   Type of Procedure  0.80 filed at 02/27/2025 1533   FABIOLA Total Score  -9.2 filed at 02/27/2025 1533   FABIOLA % 0.01 filed at 02/27/2025 1533            Assessment & Plan    Neuro:  No diagnosis or significant findings on chart review or clinical presentation and evaluation.    HEENT/Airway:   No diagnosis or significant findings on chart review or clinical presentation and evaluation.   STOP-BANG Score- 0 points lowrisk for LEXI    Mallampati::  I    TM distance::  >3 FB    Neck ROM::  Full  Dentures-denies  Crowns-denies  Implants-denies    Cardiovascular:    - No HTN or HLD  No additional preoperative testing is currently indicated.  METS: 9.9  RCRI: 0 points, 3.9%    30 day risk of MACE (risk for cardiac death, nonfatal myocardial infarction, and nonfactal cardiac arrest  FABIOLA:   0.01 % risk of intraoperative or 30-day postoperative MACE    Pulmonary:     No diagnosis or significant findings on chart review or clinical presentation and evaluation.   ARISCAT: <26 points, 1.6% risk of in-hospital postoperative pulmonary complication  PRODIGY: Low risk for opioid induced respiratory depression  Smoking History-She has never smoked.  Preoperative deep breathing educational handout provided to patient.    Renal:  No diagnosis or significant findings on chart review or clinical presentation and evaluation.   CMP-unremarkable      Endocrine:  No diagnosis or significant findings on chart review or clinical presentation and evaluation.     Hematologic:    No diagnosis or significant findings on chart review or clinical presentation and evaluation.  The patient is not a Jehovah’s witness  and will accept blood and blood products if medically indicated.   History of previous blood transfusions No  CBC-WNL    Caprini Score 6, patient at Moderate for postoperative DVT. Pt supplied education/VTE handout  Anticoagulation use: No   Preoperative DVT educational handout provided to patient.    Gastrointestinal:     No diagnosis or significant findings on chart review or clinical presentation and evaluation.   Recreational drug use: Drug use No  Alcohol use none    Apfel: 2 points 39% risk for post operative N/V    Infectious disease:    No diagnosis or significant findings on chart review or clinical presentation and evaluation.   Prescription provided for CHG body wash and dental rinse. CHG use instructions reviewed and provided to patient. Patient advised to call [facility] if rx not received.   Staph screen collected-Pending    Musculoskeletal:  No diagnosis or significant findings on chart review or clinical presentation and evaluation.      Anesthesia:  ASA 1 - Normal health patient  Anticipated anesthesia-General  History of General anesthesia- yes  Complications- No anesthesia complications  No family history of anesthesia complications    Nickel/metal allergy-negative  Shellfish allergy-negative    Discussed with patient medication instructions, NPO guidelines, and any questions or concerns. Patient does not need further workup prior to preceding with elective surgery based on based on risk assessment.       Caryl Lin PA-C 2/27/2025 4:44 PM      Pending labs ordered:  t/s and MSSA/MRSA culture  Follow up needed: labs

## 2025-02-27 NOTE — H&P (VIEW-ONLY)
CPM/PAT Evaluation       Name: Justina Vela (Justina Vela)  /Age: 2005/19 y.o.     Visit Type:   In-Person       Chief Complaint: left hip pain    HPI: Patient is a 20 yo F scheduled for left ROLF on 3/10/2025 with Dr. Brooks and Dr. Hansen secondary to congenital hip dysplasia, tear of left acetabular labrum, femoroacetabular impingement of left hip, hemarthrosis of left .  Patient was referred by Dr. Brooks and Dr. Hansen to CPM today for perioperative risk stratification and optimization.       History reviewed. No pertinent past medical history.    Past Surgical History:   Procedure Laterality Date    ADENOIDECTOMY  2015    Adenoidectomy    UPPER GASTROINTESTINAL ENDOSCOPY      WISDOM TOOTH EXTRACTION         Patient Sexual activity questions deferred to the physician.    Family History   Problem Relation Name Age of Onset    Thyroid cancer Maternal Grandmother      Heart disease Maternal Grandfather      Transient ischemic attack Maternal Grandfather      Other (great aunt with ovarian cancer) Other         No Known Allergies    Prior to Admission medications    Medication Sig Start Date End Date Taking? Authorizing Provider   albuterol 90 mcg/actuation inhaler Inhale 2 puffs every 6 hours if needed for wheezing. For season allergies that cause flare ups   Yes Historical Provider, MD   ibuprofen 200 mg tablet Take 1 tablet (200 mg) by mouth every 6 hours.   Yes Historical Provider, MD   meloxicam (Mobic) 15 mg tablet Take 1 tablet (15 mg) by mouth once daily.  Patient not taking: Reported on 2025  Silas Hansen MD        PAT ROS:   Constitutional:   neg    Neuro/Psych:   neg    Eyes:   neg    Ears:   neg    Nose:   neg    Mouth:   neg    Throat:   neg    Neck:   neg    Cardio:   neg    Respiratory:   neg    Endocrine:   neg    GI:   neg    :   neg    Musculoskeletal:    +left hip pain  Hematologic:   neg    Skin:  neg        Physical Exam  Vitals and nursing note reviewed.  "  Constitutional:       General: She is not in acute distress.     Appearance: She is not ill-appearing or toxic-appearing.   HENT:      Head: Normocephalic and atraumatic.      Nose: Nose normal.      Mouth/Throat:      Mouth: Mucous membranes are moist.   Eyes:      Extraocular Movements: Extraocular movements intact.      Conjunctiva/sclera: Conjunctivae normal.   Neck:      Vascular: No carotid bruit.   Cardiovascular:      Rate and Rhythm: Normal rate and regular rhythm.      Pulses: Normal pulses.      Heart sounds: Normal heart sounds. No murmur heard.  Pulmonary:      Effort: Pulmonary effort is normal.      Breath sounds: Normal breath sounds.   Abdominal:      General: There is no distension.      Palpations: Abdomen is soft.      Tenderness: There is no abdominal tenderness.   Musculoskeletal:         General: Normal range of motion.      Cervical back: Normal range of motion.   Skin:     General: Skin is warm and dry.      Capillary Refill: Capillary refill takes less than 2 seconds.   Neurological:      General: No focal deficit present.      Mental Status: She is alert.   Psychiatric:         Mood and Affect: Mood normal.         Behavior: Behavior normal.          PAT AIRWAY:   Airway:     Mallampati::  I    TM distance::  >3 FB    Neck ROM::  Full      Visit Vitals  /76   Pulse 67   Temp 36.9 °C (98.5 °F) (Temporal)   Ht 1.676 m (5' 6\")   Wt 65.8 kg (145 lb)   SpO2 99%   BMI 23.40 kg/m²   Smoking Status Never   BSA 1.75 m²       DASI Risk Score      Flowsheet Row Pre-Admission Testing [facility] 2/27/2025 in [facility]   Can you take care of yourself (eat, dress, bathe, or use toilet)?  2.75 filed at 02/27/2025 1528   Can you walk indoors, such as around your house? 1.75 filed at 02/27/2025 1528   Can you walk a block or two on level ground?  2.75 filed at 02/27/2025 1528   Can you climb a flight of stairs or walk up a hill? 5.5 filed at 02/27/2025 1528   Can you run a short " distance? 8 filed at 02/27/2025 1528   Can you do light work around the house like dusting or washing dishes? 2.7 filed at 02/27/2025 1528   Can you do moderate work around the house like vacuuming, sweeping floors or carrying groceries? 3.5 filed at 02/27/2025 1528   Can you do heavy work around the house like scrubbing floors or lifting and moving heavy furniture?  8 filed at 02/27/2025 1528   Can you do yard work like raking leaves, weeding or pushing a mower? 4.5 filed at 02/27/2025 1528   Can you have sexual relations? 5.25 filed at 02/27/2025 1528   Can you participate in moderate recreational activities like golf, bowling, dancing, doubles tennis or throwing a baseball or football? 6 filed at 02/27/2025 1528   Can you participate in strenous sports like swimming, singles tennis, football, basketball, or skiing? 7.5 filed at 02/27/2025 1528   DASI SCORE 58.2 filed at 02/27/2025 1528   METS Score (Will be calculated only when all the questions are answered) 9.9 filed at 02/27/2025 1528          Caprini DVT Assessment      Flowsheet Row Pre-Admission Testing from 2/27/2025 in [facility]   DVT Score (IF A SCORE IS NOT CALCULATING, MUST SELECT A BMI TO COMPLETE) 6 filed at 02/27/2025 1528   Surgical Factors Major surgery planned, lasting over 3 hours filed at 02/27/2025 1528   BMI (BMI MUST BE CHOSEN) 30 or less filed at 02/27/2025 1528          Modified Frailty Index      Flowsheet Row Pre-Admission Testing from 2/27/2025 in [facility]   Non-independent functional status (problems with dressing, bathing, personal grooming, or cooking) 0 filed at 02/27/2025 1529   History of diabetes mellitus  0 filed at 02/27/2025 1529   History of COPD 0 filed at 02/27/2025 1529   History of CHF No filed at 02/27/2025 1529   History of MI 0 filed at 02/27/2025 1529   History of Percutaneous Coronary Intervention, Cardiac Surgery, or Angina No filed at 02/27/2025 1529   Hypertension requiring the  use of medication  0 filed at 02/27/2025 1529   Peripheral vascular disease 0 filed at 02/27/2025 1529   Impaired sensorium (cognitive impairement or loss, clouding, or delirium) 0 filed at 02/27/2025 1529   TIA or CVA withouy residual deficit 0 filed at 02/27/2025 1529   Cerebrovascular accident with deficit 0 filed at 02/27/2025 1529   Modified Frailty Index Calculator 0 filed at 02/27/2025 1529          [id number] Stroke Risk Points  Current as of 33 minutes ago        N/A 0 to 9 Points:      Last Change: N/A          The [id number] risk score (Lip DEEDEE, et al. 2009. © 2010 [employer]) quantifies the risk of stroke for a patient with atrial fibrillation. For patients without atrial fibrillation or under the age of 18 this score appears as N/A. Higher score values generally indicate higher risk of stroke.        This score is not applicable to this patient. Components are not calculated.          Revised Cardiac Risk Index      Flowsheet Row Pre-Admission Testing from 2/27/2025 in [facility]   High-Risk Surgery (Intraperitoneal, Intrathoracic,Suprainguinal vascular) 0 filed at 02/27/2025 1528   History of ischemic heart disease (History of MI, History of positive exercuse test, Current chest paint considered due to myocardial ischemia, Use of nitrate therapy, ECG with pathological Q Waves) 0 filed at 02/27/2025 1528   History of congestive heart failure (pulmonary edemia, bilateral rales or S3 gallop, Paroxysmal nocturnal dyspnea, CXR showing pulmonary vascular redistribution) 0 filed at 02/27/2025 1528   History of cerebrovascular disease (Prior TIA or stroke) 0 filed at 02/27/2025 1528   Pre-operative insulin treatment 0 filed at 02/27/2025 1528   Pre-operative creatinine>2 mg/dl 0 filed at 02/27/2025 1528   Revised Cardiac Risk Calculator 0 filed at 02/27/2025 1528          Apfel Simplified Score      Flowsheet Row Pre-Admission Testing from 2/27/2025 in [facility]   Smoking status 1 filed at 02/27/2025 1528   History of motion sickness or PONV  0 filed at 02/27/2025 1528   Use of postoperative opioids 0 filed at 02/27/2025 1528   Gender - Female 1=Yes filed at 02/27/2025 1528   Apfel Simplified Score Calculator 2 filed at 02/27/2025 1528          Risk Analysis Index Results This Encounter    No data found in the last 10 encounters.       Stop Bang Score      Flowsheet Row Pre-Admission Testing from 2/27/2025 in [facility]   Do you snore loudly? 0 filed at 02/27/2025 1512   Do you often feel tired or fatigued after your sleep? 0 filed at 02/27/2025 1512   Has anyone ever observed you stop breathing in your sleep? 0 filed at 02/27/2025 1512   Do you have or are you being treated for high blood pressure? 0 filed at 02/27/2025 1512   Recent BMI (Calculated) 23.4 filed at 02/27/2025 1512   Is BMI greater than 35 kg/m2? 0=No filed at 02/27/2025 1512   Age older than 50 years old? 0=No filed at 02/27/2025 1512   Is your neck circumference greater than 17 inches (Male) or 16 inches (Female)? 0 filed at 02/27/2025 1512   Gender - Male 0=No filed at 02/27/2025 1512   STOP-BANG Total Score 0 filed at 02/27/2025 1512          Prodigy: High Risk  Total Score: 0          ARISCAT Score for Postoperative Pulmonary Complications      Flowsheet Row Pre-Admission Testing from 2/27/2025 in [facility]   Age Calculated Score 0 filed at 02/27/2025 1529   Preoperative SpO2 0 filed at 02/27/2025 1529   Respiratory infection in the last month Either upper or lower (i.e., URI, bronchitis, pneumonia), with fever and antibiotic treatment 0 filed at 02/27/2025 1529   Preoperative anemia (Hgb less than 10 g/dl) 0 filed at 02/27/2025 1529   Surgical incision  0 filed at 02/27/2025 1529   Duration of surgery  23 filed at 02/27/2025 1529   Emergency Procedure  0 filed at 02/27/2025 1529   ARISCAT Total Score  23 filed at 02/27/2025 1529          Saumya  Perioperative Risk for Myocardial Infarction or Cardiac Arrest (FABIOLA)      Flowsheet Row Pre-Admission Testing from 2/27/2025 in [facility]   Calculated Age Score 0.42 filed at 02/27/2025 1533   Functional Status  0 filed at 02/27/2025 1533   ASA Class  -5.17 filed at 02/27/2025 1533   Creatinine 0 filed at 02/27/2025 1533   Type of Procedure  0.80 filed at 02/27/2025 1533   FABIOLA Total Score  -9.2 filed at 02/27/2025 1533   FABIOLA % 0.01 filed at 02/27/2025 1533            Assessment & Plan    Neuro:  No diagnosis or significant findings on chart review or clinical presentation and evaluation.    HEENT/Airway:   No diagnosis or significant findings on chart review or clinical presentation and evaluation.   STOP-BANG Score- 0 points lowrisk for LEXI    Mallampati::  I    TM distance::  >3 FB    Neck ROM::  Full  Dentures-denies  Crowns-denies  Implants-denies    Cardiovascular:    - No HTN or HLD  No additional preoperative testing is currently indicated.  METS: 9.9  RCRI: 0 points, 3.9%    30 day risk of MACE (risk for cardiac death, nonfatal myocardial infarction, and nonfactal cardiac arrest  FABIOLA:   0.01 % risk of intraoperative or 30-day postoperative MACE    Pulmonary:     No diagnosis or significant findings on chart review or clinical presentation and evaluation.   ARISCAT: <26 points, 1.6% risk of in-hospital postoperative pulmonary complication  PRODIGY: Low risk for opioid induced respiratory depression  Smoking History-She has never smoked.  Preoperative deep breathing educational handout provided to patient.    Renal:  No diagnosis or significant findings on chart review or clinical presentation and evaluation.   CMP-unremarkable      Endocrine:  No diagnosis or significant findings on chart review or clinical presentation and evaluation.     Hematologic:    No diagnosis or significant findings on chart review or clinical presentation and evaluation.  The patient is not a Jehovah’s witness  and will accept blood and blood products if medically indicated.   History of previous blood transfusions No  CBC-WNL    Caprini Score 6, patient at Moderate for postoperative DVT. Pt supplied education/VTE handout  Anticoagulation use: No   Preoperative DVT educational handout provided to patient.    Gastrointestinal:     No diagnosis or significant findings on chart review or clinical presentation and evaluation.   Recreational drug use: Drug use No  Alcohol use none    Apfel: 2 points 39% risk for post operative N/V    Infectious disease:    No diagnosis or significant findings on chart review or clinical presentation and evaluation.   Prescription provided for CHG body wash and dental rinse. CHG use instructions reviewed and provided to patient. Patient advised to call [facility] if rx not received.   Staph screen collected-Pending    Musculoskeletal:  No diagnosis or significant findings on chart review or clinical presentation and evaluation.      Anesthesia:  ASA 1 - Normal health patient  Anticipated anesthesia-General  History of General anesthesia- yes  Complications- No anesthesia complications  No family history of anesthesia complications    Nickel/metal allergy-negative  Shellfish allergy-negative    Discussed with patient medication instructions, NPO guidelines, and any questions or concerns. Patient does not need further workup prior to preceding with elective surgery based on based on risk assessment.       Caryl Lin PA-C 2/27/2025 4:44 PM      Pending labs ordered:  t/s and MSSA/MRSA culture  Follow up needed: labs

## 2025-02-27 NOTE — PREPROCEDURE INSTRUCTIONS
Medication List            Accurate as of February 27, 2025  3:23 PM. Always use your most recent med list.                albuterol 90 mcg/actuation inhaler  Medication Adjustments for Surgery: Take/Use as prescribed     chlorhexidine 0.12 % solution  Commonly known as: Peridex  Swish and spit 15 mL night before surgery and morning of surgery     ibuprofen 200 mg tablet  Additional Medication Adjustments for Surgery: Take last dose 7 days before surgery  Notes to patient: Last dose 3/2/2025     meloxicam 15 mg tablet  Commonly known as: Mobic  Take 1 tablet (15 mg) by mouth once daily.  Additional Medication Adjustments for Surgery: Take last dose 7 days before surgery  Notes to patient: Last dose 3/2/2025                        Thank you for visiting [facility].  If you have any changes to your health condition, please call the surgeons office to alert them and give them details of your symptoms.    Caryl Lin PA-C  P: [phone]  Department of Anesthesiology and Perioperative Medicine  --    Preoperative Fasting Guidelines    Why must I stop eating and drinking near surgery time?  With sedation, food or liquid in your stomach can enter your lungs causing serious complications  Increases nausea and vomiting    When do I need to stop eating and drinking before my surgery?  Do not eat any food after midnight the night before your surgery/procedure.  You may have up to 13.5 ounces of clear liquid until TWO hours before your instructed arrival time to the hospital.  This includes water, black tea/coffee, (no milk or cream) apple juice, and electrolyte drinks (Gatorade)  You may chew gum until TWO hours before your surgery/procedure              Preoperative Brain Exercises    What are brain exercises?  A brain exercise is any activity that engages your thinking (cognitive) skills.    What types of activities are considered brain exercises?  Jigsaw puzzles, crossword puzzles, word jumble,  memory games, word search, and many more.  Many can be found free online or on your phone via a mobile christina.    Why should I do brain exercises before my surgery?  More recent research has shown brain exercise before surgery can lower the risk of postoperative delirium (confusion) which can be especially important for older adults.  Patients who did brain exercises for 5 to 10 hours the days before surgery, cut their risk of postoperative delirium in half up to 1 week after surgery.                  [facility]    Preoperative Deep Breathing Exercises    Why it is important to do deep breathing exercises before my surgery?  Deep breathing exercises strengthen your breathing muscles.  This helps you to recover after your surgery and decreases the chance of breathing complications.      How are the deep breathing exercises done?  Sit straight with your back supported.  Breathe in deeply and slowly through your nose. Your lower rib cage should expand and your abdomen may move forward.  Hold that breath for 3 to 5 seconds.  Breathe out through pursed lips, slowly and completely.  Rest and repeat 10 times every hour while awake.  Rest longer if you become dizzy or lightheaded.      Patient Information: Incentive Spirometer  What is an incentive spirometer?  An incentive spirometer is a device used before and after surgery to “exercise” your lungs.  It helps you to take deeper breaths to expand your lungs.  Below is an example of a basic incentive spirometer.  The device you receive may differ slightly but they all function the same.    Why do I need to use an incentive spirometer?  Using your incentive spirometer prepares your lungs for surgery and helps prevent lung problems after surgery.  How do I use my incentive spirometer?  When you're using your incentive spirometer, make sure to breathe through your mouth. If you breathe through your nose, the incentive spirometer won't work properly. You  can hold your nose if you have trouble.  If you feel dizzy at any time, stop and rest. Try again at a later time.  Follow the steps below:  Set up your incentive spirometer, expand the flexible tubing and connect to the outlet.  Sit upright in a chair or bed. Hold the incentive spirometer at eye level.   Put the mouthpiece in your mouth and close your lips tightly around it. Slowly breathe out (exhale) completely.  Breathe in (inhale) slowly through your mouth as deeply as you can. As you take a breath, you will see the piston rise inside the large column. While the piston rises, the indicator should move upwards. It should stay in between the 2 arrows (see Figure).  Try to get the piston as high as you can, while keeping the indicator between the arrows.   If the indicator doesn't stay between the arrows, you're breathing either too fast or too slow.  When you get it as high as you can, hold your breath for 10 seconds, or as long as possible. While you're holding your breath, the piston will slowly fall to the base of the spirometer.  Once the piston reaches the bottom of the spirometer, breathe out slowly through your mouth. Rest for a few seconds.  Repeat 10 times. Try to get the piston to the same level with each breath.  Repeat every hour while awake  You can carefully clean the outside of the mouthpiece with an alcohol wipe or soap and water.            Patient and Family Education             Ways You Can Help Prevent Blood Clots             This handout explains some simple things you can do to help prevent blood clots.      Blood clots are blockages that can form in the body's veins. When a blood clot forms in your deep veins, it may be called a deep vein thrombosis, or DVT for short. Blood clots can happen in any part of the body where blood flows, but they are most common in the arms and legs. If a piece of a blood clot breaks free and travels to the lungs, it is called a pulmonary embolus (PE). A PE can  be a very serious problem.         Being in the hospital or having surgery can raise your chances of getting a blood clot because you may not be well enough to move around as much as you normally do.         Ways you can help prevent blood clots in the hospital         Wearing SCDs. SCDs stands for Sequential Compression Devices.   SCDs are special sleeves that wrap around your legs  They attach to a pump that fills them with air to gently squeeze your legs every few minutes.   This helps return the blood in your legs to your heart.   SCDs should only be taken off when walking or bathing.   SCDs may not be comfortable, but they can help save your life.               Wearing compression stockings - if your doctor orders them. These special snug fitting stockings gently squeeze your legs to help blood flow.       Walking. Walking helps move the blood in your legs.   If your doctor says it is ok, try walking the halls at least   5 times a day. Ask us to help you get up, so you don't fall.      Taking any blood thinning medicines your doctor orders.             [facility]; 3/23       Ways you can help prevent blood clots at home       Wearing compression stockings - if your doctor orders them. ? Walking - to help move the blood in your legs.       Taking any blood thinning medicines your doctor orders.      Signs of a blood clot or PE      Tell your doctor or nurse know right away if you have of the problems listed below.    If you are at home, seek medical care right away. Call 911 for chest pain or problems breathing.               Signs of a blood clot (DVT) - such as pain,  swelling, redness or warmth in your arm or leg      Signs of a pulmonary embolism (PE) - such as chest     pain or feeling short of breath           The Week before Surgery        Seven days before Surgery  Check your [facility] medication instructions  Do the exercises provided to you by [facility]   Arrange for a responsible, adult licensed   to take you home after surgery and stay with you for 24 hours.  You will not be permitted to drive yourself home if you have received any anesthetic/sedation  Six days before surgery  Check your [facility] medication instructions  Do the exercises provided to you by [facility]   Start using Chlorhexidene (CHG) body wash if prescribed  Five days before surgery  Check your [facility] medication instructions  Do the exercises provided to you by [facility]   Continue to use CHG body wash if prescribed  Three days before surgery  Check your [facility] medication instructions  Do the exercises provided to you by [facility]   Continue to use CHG body wash if prescribed  Two days before surgery  Check your [facility] medication instructions  Do the exercises provided to you by [facility]   Continue to use CHG body wash if prescribed    The Day before Surgery       Check your [facility] medication and all other [facility] instructions including when to stop eating and drinking  You will be called with your arrival time for surgery in the late afternoon.  If you do not receive a call please reach out to your surgeon's office.  Do not smoke or drink 24 hours before surgery  Prepare items to bring with you to the hospital  Shower with your chlorhexidine wash if prescribed  Brush your teeth and use your chlorhexidine dental rinse if prescribed    The Day of Surgery       Check your [facility] medication instructions  Ensure you follow the instructions for when to stop eating and drinking  Shower, if prescribed use CHG.  Do not apply any lotions, creams, moisturizers, perfume or deodorant  Brush your teeth and use your CHG dental rinse if prescribed  Wear loose comfortable clothing  Avoid make-up  Remove  jewelry and piercings, consider professional piercing removal with a plastic spacer if needed  Bring photo ID and Insurance card  Bring an accurate medication list that includes medication dose, frequency and allergies  Bring a copy of your advanced directives (will, health care power of )  Bring  any devices and controllers as well as medical devices you have been provided with for surgery (CPAP, slings, braces, etc.)  Dentures, eyeglasses, and contacts will be removed before surgery, please bring cases for contacts or glasses        Patient Information: Pre-Operative Infection Prevention Measures     Why did I have my nose, under my arms, and groin swabbed?  The purpose of the swab is to identify Staphylococcus aureus inside your nose or on your skin.  The swab was sent to the laboratory for culture.  A positive swab/culture for Staphylococcus aureus is called colonization or carriage.      What is Staphylococcus aureus?  Staphylococcus aureus, also known as “staph”, is a germ found on the skin or in the nose of healthy people.  Sometimes Staphylococcus aureus can get into the body and cause an infection.  This can be minor (such as pimples, boils, or other skin problems).  It might also be serious (such as a blood infection, pneumonia, or a surgical site infection).    What is Staphylococcus aureus colonization or carriage?  Colonization or carriage means that a person has the germ but is not sick from it.  These bacteria can be spread on the hands or when breathing or sneezing.    How is Staphylococcus aureus spread?  It is most often spread by close contact with a person or item that carries it.    What happens if my culture is positive for Staphylococcus aureus?  Your doctor/medical team will use this information to guide any antibiotic treatment which may be necessary.  Regardless of the culture results, we will clean the inside of your nose with a betadine swab just before you have your surgery.      Will I get an infection if I have Staphylococcus aureus in my nose or on my skin?  Anyone can get an infection with Staphylococcus aureus.  However, the best way to reduce your risk of infection is to follow the instructions provided to you for the use of your CHG soap and dental rinse.        Patient  Information: Oral/Dental Rinse    What is oral/dental rinse?   It is a mouthwash. It is a way of cleaning the mouth with a germ-killing solution before your surgery.  The solution contains chlorhexidine, commonly known as CHG.   It is used inside the mouth to kill a bacteria known as Staphylococcus aureus.  Let your doctor know if you are allergic to Chlorhexidine.    Why do I need to use CHG oral/dental rinse?  The CHG oral/dental rinse helps to kill a bacteria in your mouth known as Staphylococcus aureus.     This reduces the risk of infection at the surgical site.      Using your CHG oral/dental rinse  STEPS:  Use your CHG oral/dental rinse after you brush your teeth the night before (at bedtime) and the morning of your surgery.  Follow all directions on your prescription label.    Use the cap on the container to measure 15ml   Swish (gargle if you can) the mouthwash in your mouth for at least 30 seconds, (do not swallow) and spit out  After you use your CHG rinse, do not rinse your mouth with water, drink or eat.  Please refer to the prescription label for the appropriate time to resume oral intake      What side effects might I have using the CHG oral/dental rinse?  CHG rinse will stick to plaque on the teeth.  Brush and floss just before use.  Teeth brushing will help avoid staining of plaque during use.      Patient Information: Home Preoperative Antibacterial Shower      What is a home preoperative antibacterial shower?  This shower is a way of cleaning the skin with a germ-killing solution before surgery.  The solution contains chlorhexidine, commonly known as CHG.  CHG is a skin cleanser with germ-killing ability.  Let your doctor know if you are allergic to chlorhexidine.    Why do I need to take a preoperative antibacterial shower?  Skin is not sterile.  It is best to try to make your skin as free of germs as possible before surgery.  Proper cleansing with a germ-killing soap before surgery can lower the  number of germs on your skin.  This helps to reduce the risk of infection at the surgical site.  Following the instructions listed below will help you prepare your skin for surgery.      How do I use the solution?  Steps:  Begin using your CHG soap 1 day before your scheduled surgery on _______3/9/2025_________________.    First, wash and rinse your hair using the CHG soap. Keep CHG soap away from ear canals and eyes.  Rinse completely, do not condition.  Hair extensions should be removed.  Wash your face with your normal soap and rinse.    Apply the CHG solution to a clean wet washcloth.  Turn the water off or move away from the water spray to avoid premature rinsing of the CHG soap as you are applying.   Firmly lather your entire body from the neck down.  Do not use on your face.  Pay special attention to the area(s) where your incision(s) will be located unless they are on your face.  Avoid scrubbing your skin too hard.  The important point is to have the CHG soap sit on your skin for 3 minutes.    When the 3 minutes are up, turn on the water and rinse the CHG solution off your body completely.   DO NOT wash with regular soap after you have used the CHG soap solution  Pat yourself dry with a clean, freshly-laundered towel.  DO NOT apply powders, deodorants, or lotions.  Dress in clean, freshly laundered nightclothes.    Be sure to sleep with clean, freshly laundered sheets.  Be aware that CHG will cause stains on fabrics; if you wash them with bleach after use.  Rinse your washcloth and other linens that have contact with CHG completely.  Use only non-chlorine detergents to launder the items used.   The morning of surgery is the fifth day.  Repeat the above steps and dress in clean comfortable clothing     Whom should I contact if I have any questions regarding the use of CHG soap?  Call the [facility]

## 2025-02-27 NOTE — CPM/PAT H&P
CPM/PAT Evaluation       Name: Justina Vela (Justina Vela)  /Age: 2005/ y.o.     Visit Type:   In-Person       Chief Complaint: ***    HPI: Patient is a 20 yo F scheduled for left ROLF on 3/10/2025 with Dr. Brooks and Dr. Hansen secondary to congenital hip dysplasia, tear of left acetabular labrum, femoroacetabular impingement of left hip, hemarthrosis of left .  Patient was referred by Dr. Brooks and Dr. Hansen to CPM today for perioperative risk stratification and optimization. Patient's PMHx is notable for ***      History reviewed. No pertinent past medical history.    Past Surgical History:   Procedure Laterality Date   • ADENOIDECTOMY  2015    Adenoidectomy   • UPPER GASTROINTESTINAL ENDOSCOPY     • WISDOM TOOTH EXTRACTION         Patient Sexual activity questions deferred to the physician.    Family History   Problem Relation Name Age of Onset   • Thyroid cancer Maternal Grandmother     • Heart disease Maternal Grandfather     • Transient ischemic attack Maternal Grandfather     • Other (great aunt with ovarian cancer) Other         No Known Allergies    Prior to Admission medications    Medication Sig Start Date End Date Taking? Authorizing Provider   albuterol 90 mcg/actuation inhaler Inhale 2 puffs every 6 hours if needed for wheezing. For season allergies that cause flare ups   Yes Historical Provider, MD   ibuprofen 200 mg tablet Take 1 tablet (200 mg) by mouth every 6 hours.   Yes Historical Provider, MD   meloxicam (Mobic) 15 mg tablet Take 1 tablet (15 mg) by mouth once daily.  Patient not taking: Reported on 2025  Silas Hansen MD        PAT ROS:   Constitutional:   neg    Neuro/Psych:   neg    Eyes:   neg    Ears:   neg    Nose:   neg    Mouth:   neg    Throat:   neg    Neck:   neg    Cardio:   neg    Respiratory:   neg    Endocrine:   neg    GI:   neg    :   neg    Musculoskeletal:    +left hip pain  Hematologic:   neg    Skin:  neg        Physical Exam  Vitals  "and nursing note reviewed.   Constitutional:       General: She is not in acute distress.     Appearance: She is not ill-appearing or toxic-appearing.   HENT:      Head: Normocephalic and atraumatic.      Nose: Nose normal.      Mouth/Throat:      Mouth: Mucous membranes are moist.   Eyes:      Conjunctiva/sclera: Conjunctivae normal.   Neck:      Vascular: No carotid bruit.   Cardiovascular:      Rate and Rhythm: Normal rate and regular rhythm.      Heart sounds: Normal heart sounds. No murmur heard.  Pulmonary:      Effort: Pulmonary effort is normal.      Breath sounds: Normal breath sounds.   Abdominal:      General: There is no distension.      Palpations: Abdomen is soft.      Tenderness: There is no abdominal tenderness.   Musculoskeletal:         General: Normal range of motion.      Cervical back: Normal range of motion.   Skin:     General: Skin is warm and dry.      Capillary Refill: Capillary refill takes less than 2 seconds.   Neurological:      General: No focal deficit present.      Mental Status: She is alert.   Psychiatric:         Mood and Affect: Mood normal.         Behavior: Behavior normal.          PAT AIRWAY:   Airway:     Mallampati::  I    TM distance::  >3 FB    Neck ROM::  Full      Visit Vitals  /76   Pulse 67   Temp 36.9 °C (98.5 °F) (Temporal)   Ht 1.676 m (5' 6\")   Wt 65.8 kg (145 lb)   SpO2 99%   BMI 23.40 kg/m²   Smoking Status Never   BSA 1.75 m²       DASI Risk Score      Flowsheet Row Pre-Admission Testing [facility] 2/27/2025 in [facility]   Can you take care of yourself (eat, dress, bathe, or use toilet)?  2.75 filed at 02/27/2025 1528   Can you walk indoors, such as around your house? 1.75 filed at 02/27/2025 1528   Can you walk a block or two on level ground?  2.75 filed at 02/27/2025 1528   Can you climb a flight of stairs or walk up a hill? 5.5 filed at 02/27/2025 1528   Can you run a short distance? 8 filed at 02/27/2025 1528   Can you do light work around " the house like dusting or washing dishes? 2.7 filed at 02/27/2025 1528   Can you do moderate work around the house like vacuuming, sweeping floors or carrying groceries? 3.5 filed at 02/27/2025 1528   Can you do heavy work around the house like scrubbing floors or lifting and moving heavy furniture?  8 filed at 02/27/2025 1528   Can you do yard work like raking leaves, weeding or pushing a mower? 4.5 filed at 02/27/2025 1528   Can you have sexual relations? 5.25 filed at 02/27/2025 1528   Can you participate in moderate recreational activities like golf, bowling, dancing, doubles tennis or throwing a baseball or football? 6 filed at 02/27/2025 1528   Can you participate in strenous sports like swimming, singles tennis, football, basketball, or skiing? 7.5 filed at 02/27/2025 1528   DASI SCORE 58.2 filed at 02/27/2025 1528   METS Score (Will be calculated only when all the questions are answered) 9.9 filed at 02/27/2025 1528          Caprini DVT Assessment      Flowsheet Row Pre-Admission Testing from 2/27/2025 in [facility]   DVT Score (IF A SCORE IS NOT CALCULATING, MUST SELECT A BMI TO COMPLETE) 6 filed at 02/27/2025 1528   Surgical Factors Major surgery planned, lasting over 3 hours filed at 02/27/2025 1528   BMI (BMI MUST BE CHOSEN) 30 or less filed at 02/27/2025 1528          Modified Frailty Index      Flowsheet Row Pre-Admission Testing from 2/27/2025 in [facility]   Non-independent functional status (problems with dressing, bathing, personal grooming, or cooking) 0 filed at 02/27/2025 1529   History of diabetes mellitus  0 filed at 02/27/2025 1529   History of COPD 0 filed at 02/27/2025 1529   History of CHF No filed at 02/27/2025 1529   History of MI 0 filed at 02/27/2025 1529   History of Percutaneous Coronary Intervention, Cardiac Surgery, or Angina No filed at 02/27/2025 1529   Hypertension requiring the use of medication  0 filed at 02/27/2025 1529   Peripheral vascular  disease 0 filed at 02/27/2025 1529   Impaired sensorium (cognitive impairement or loss, clouding, or delirium) 0 filed at 02/27/2025 1529   TIA or CVA withouy residual deficit 0 filed at 02/27/2025 1529   Cerebrovascular accident with deficit 0 filed at 02/27/2025 1529   Modified Frailty Index Calculator 0 filed at 02/27/2025 1529          [id number] Stroke Risk Points  Current as of just now        N/A 0 to 9 Points:      Last Change: N/A          The [id number] risk score (Arthur MARK, et al. 2009. © 2010 [employer]) quantifies the risk of stroke for a patient with atrial fibrillation. For patients without atrial fibrillation or under the age of 18 this score appears as N/A. Higher score values generally indicate higher risk of stroke.        This score is not applicable to this patient. Components are not calculated.          Revised Cardiac Risk Index      Flowsheet Row Pre-Admission Testing from 2/27/2025 in [facility]   High-Risk Surgery (Intraperitoneal, Intrathoracic,Suprainguinal vascular) 0 filed at 02/27/2025 1528   History of ischemic heart disease (History of MI, History of positive exercuse test, Current chest paint considered due to myocardial ischemia, Use of nitrate therapy, ECG with pathological Q Waves) 0 filed at 02/27/2025 1528   History of congestive heart failure (pulmonary edemia, bilateral rales or S3 gallop, Paroxysmal nocturnal dyspnea, CXR showing pulmonary vascular redistribution) 0 filed at 02/27/2025 1528   History of cerebrovascular disease (Prior TIA or stroke) 0 filed at 02/27/2025 1528   Pre-operative insulin treatment 0 filed at 02/27/2025 1528   Pre-operative creatinine>2 mg/dl 0 filed at 02/27/2025 1528   Revised Cardiac Risk Calculator 0 filed at 02/27/2025 1528          Apfel Simplified Score      Flowsheet Row Pre-Admission Testing from 2/27/2025 in [facility]   Smoking status 1 filed at 02/27/2025 1528   History of  motion sickness or PONV  0 filed at 02/27/2025 1528   Use of postoperative opioids 0 filed at 02/27/2025 1528   Gender - Female 1=Yes filed at 02/27/2025 1528   Apfel Simplified Score Calculator 2 filed at 02/27/2025 1528          Risk Analysis Index Results This Encounter    No data found in the last 10 encounters.       Stop Bang Score      Flowsheet Row Pre-Admission Testing from 2/27/2025 in [facility]   Do you snore loudly? 0 filed at 02/27/2025 1512   Do you often feel tired or fatigued after your sleep? 0 filed at 02/27/2025 1512   Has anyone ever observed you stop breathing in your sleep? 0 filed at 02/27/2025 1512   Do you have or are you being treated for high blood pressure? 0 filed at 02/27/2025 1512   Recent BMI (Calculated) 23.4 filed at 02/27/2025 1512   Is BMI greater than 35 kg/m2? 0=No filed at 02/27/2025 1512   Age older than 50 years old? 0=No filed at 02/27/2025 1512   Is your neck circumference greater than 17 inches (Male) or 16 inches (Female)? 0 filed at 02/27/2025 1512   Gender - Male 0=No filed at 02/27/2025 1512   STOP-BANG Total Score 0 filed at 02/27/2025 1512          Prodigy: High Risk  Total Score: 0          ARISCAT Score for Postoperative Pulmonary Complications      Flowsheet Row Pre-Admission Testing from 2/27/2025 in [facility]   Age Calculated Score 0 filed at 02/27/2025 1529   Preoperative SpO2 0 filed at 02/27/2025 1529   Respiratory infection in the last month Either upper or lower (i.e., URI, bronchitis, pneumonia), with fever and antibiotic treatment 0 filed at 02/27/2025 1529   Preoperative anemia (Hgb less than 10 g/dl) 0 filed at 02/27/2025 1529   Surgical incision  0 filed at 02/27/2025 1529   Duration of surgery  23 filed at 02/27/2025 1529   Emergency Procedure  0 filed at 02/27/2025 1529   ARISCAT Total Score  23 filed at 02/27/2025 1529          Saumya Perioperative Risk for Myocardial Infarction or Cardiac Arrest (FABIOLA)       Flowsheet Row Pre-Admission Testing from 2/27/2025 in [facility]   Calculated Age Score 0.42 filed at 02/27/2025 1533   Functional Status  0 filed at 02/27/2025 1533   ASA Class  -5.17 filed at 02/27/2025 1533   Creatinine 0 filed at 02/27/2025 1533   Type of Procedure  0.80 filed at 02/27/2025 1533   FABIOLA Total Score  -9.2 filed at 02/27/2025 1533   FABIOLA % 0.01 filed at 02/27/2025 1533            Assessment & Plan    Neuro:  No diagnosis or significant findings on chart review or clinical presentation and evaluation.    HEENT/Airway:   No diagnosis or significant findings on chart review or clinical presentation and evaluation.   STOP-BANG Score- 0 points lowrisk for LEXI    Mallampati::  I    TM distance::  >3 FB    Neck ROM::  Full  Dentures-denies  Crowns-denies  Implants-denies    Cardiovascular:    - No HTN or HLD  No additional preoperative testing is currently indicated.  METS: 9.9  RCRI: 0 points, 3.9%    30 day risk of MACE (risk for cardiac death, nonfatal myocardial infarction, and nonfactal cardiac arrest  FABIOLA:   0.01 % risk of intraoperative or 30-day postoperative MACE    Pulmonary:     No diagnosis or significant findings on chart review or clinical presentation and evaluation.   ARISCAT: <26 points, 1.6% risk of in-hospital postoperative pulmonary complication  PRODIGY: Low risk for opioid induced respiratory depression  Smoking History-She has never smoked.  Preoperative deep breathing educational handout provided to patient.    Renal:  No diagnosis or significant findings on chart review or clinical presentation and evaluation.   CMP-Pending      Endocrine:  No diagnosis or significant findings on chart review or clinical presentation and evaluation.     Hematologic:    No diagnosis or significant findings on chart review or clinical presentation and evaluation.  The patient is not a Jehovah’s witness and will accept blood and blood products if medically indicated.   History  of previous blood transfusions No  CBC-Pending    Caprini Score ***, patient at {Low/Medium/High:96037} for postoperative DVT. Pt supplied education/VTE handout  Anticoagulation use: {YES NO AND WILDCARDS:34226}   Preoperative DVT educational handout provided to patient.    Gastrointestinal:     No diagnosis or significant findings on chart review or clinical presentation and evaluation.   Recreational drug use: Drug use {Yes- *** No N/A:27968}  Alcohol use {history; alcohol:25136}    Apfel: {samapfel1:36060} points {samapfel2:71420} risk for post operative N/V    Infectious disease:    No diagnosis or significant findings on chart review or clinical presentation and evaluation.   Prescription provided for CHG body wash and dental rinse. CHG use instructions reviewed and provided to patient. Patient advised to call [facility] if rx not received.   Staph screen collected-Pending    Musculoskeletal:  No diagnosis or significant findings on chart review or clinical presentation and evaluation.      Anesthesia:  ASA 1 - Normal health patient  Anticipated anesthesia-{Desc; anesthesia types:97566}  History of General anesthesia- {yes/no:71555}  Complications- {Anesthesia Complications:99103}  No family history of anesthesia complications    Abnormalities noted on PAT evaluation: {Yes- *** No N/A:83697}    Nickel/metal allergy-negative  Shellfish allergy-negative    Discussed with patient medication instructions, NPO guidelines, and any questions or concerns. Patient does not need further workup prior to preceding with elective surgery based on based on risk assessment.       Caryl Lin PA-C 2/27/2025 4:10 PM      Pending labs ordered:  {[facility]}  Follow up needed:

## 2025-03-01 LAB — STAPHYLOCOCCUS SPEC CULT: ABNORMAL

## 2025-03-10 ENCOUNTER — ANESTHESIA EVENT (OUTPATIENT)
Dept: OPERATING ROOM | Facility: HOSPITAL | Age: 20
End: 2025-03-10
Payer: COMMERCIAL

## 2025-03-10 ENCOUNTER — APPOINTMENT (OUTPATIENT)
Dept: RADIOLOGY | Facility: HOSPITAL | Age: 20
End: 2025-03-10
Payer: COMMERCIAL

## 2025-03-10 ENCOUNTER — HOSPITAL ENCOUNTER (INPATIENT)
Facility: HOSPITAL | Age: 20
LOS: 2 days | Discharge: HOME | End: 2025-03-12
Attending: ORTHOPAEDIC SURGERY | Admitting: ORTHOPAEDIC SURGERY
Payer: COMMERCIAL

## 2025-03-10 ENCOUNTER — ANESTHESIA (OUTPATIENT)
Dept: OPERATING ROOM | Facility: HOSPITAL | Age: 20
End: 2025-03-10
Payer: COMMERCIAL

## 2025-03-10 ENCOUNTER — PHARMACY VISIT (OUTPATIENT)
Dept: PHARMACY | Facility: CLINIC | Age: 20
End: 2025-03-10
Payer: MEDICARE

## 2025-03-10 DIAGNOSIS — S73.192A TEAR OF ACETABULAR LABRUM, LEFT, INITIAL ENCOUNTER: ICD-10-CM

## 2025-03-10 DIAGNOSIS — M25.852 FEMOROACETABULAR IMPINGEMENT OF LEFT HIP: ICD-10-CM

## 2025-03-10 DIAGNOSIS — Q65.89 CONGENITAL HIP DYSPLASIA (HHS-HCC): Primary | ICD-10-CM

## 2025-03-10 DIAGNOSIS — M25.052 HEMARTHROSIS OF LEFT HIP: ICD-10-CM

## 2025-03-10 LAB — HCG UR QL IA.RAPID: NEGATIVE

## 2025-03-10 PROCEDURE — 2500000004 HC RX 250 GENERAL PHARMACY W/ HCPCS (ALT 636 FOR OP/ED): Performed by: ORTHOPAEDIC SURGERY

## 2025-03-10 PROCEDURE — 2500000004 HC RX 250 GENERAL PHARMACY W/ HCPCS (ALT 636 FOR OP/ED): Performed by: PHYSICIAN ASSISTANT

## 2025-03-10 PROCEDURE — 3600000009 HC OR TIME - EACH INCREMENTAL 1 MINUTE - PROCEDURE LEVEL FOUR: Performed by: ORTHOPAEDIC SURGERY

## 2025-03-10 PROCEDURE — 99222 1ST HOSP IP/OBS MODERATE 55: CPT | Performed by: INTERNAL MEDICINE

## 2025-03-10 PROCEDURE — 2500000004 HC RX 250 GENERAL PHARMACY W/ HCPCS (ALT 636 FOR OP/ED): Performed by: NURSE ANESTHETIST, CERTIFIED REGISTERED

## 2025-03-10 PROCEDURE — 2500000005 HC RX 250 GENERAL PHARMACY W/O HCPCS: Performed by: ORTHOPAEDIC SURGERY

## 2025-03-10 PROCEDURE — 7100000001 HC RECOVERY ROOM TIME - INITIAL BASE CHARGE: Performed by: ORTHOPAEDIC SURGERY

## 2025-03-10 PROCEDURE — RXMED WILLOW AMBULATORY MEDICATION CHARGE

## 2025-03-10 PROCEDURE — 64450 NJX AA&/STRD OTHER PN/BRANCH: CPT | Performed by: STUDENT IN AN ORGANIZED HEALTH CARE EDUCATION/TRAINING PROGRAM

## 2025-03-10 PROCEDURE — 2500000004 HC RX 250 GENERAL PHARMACY W/ HCPCS (ALT 636 FOR OP/ED): Mod: JZ | Performed by: STUDENT IN AN ORGANIZED HEALTH CARE EDUCATION/TRAINING PROGRAM

## 2025-03-10 PROCEDURE — 2500000004 HC RX 250 GENERAL PHARMACY W/ HCPCS (ALT 636 FOR OP/ED)

## 2025-03-10 PROCEDURE — 1100000001 HC PRIVATE ROOM DAILY

## 2025-03-10 PROCEDURE — S2115 PERIACETABULAR OSTEOTOMY: HCPCS | Performed by: ORTHOPAEDIC SURGERY

## 2025-03-10 PROCEDURE — 2500000001 HC RX 250 WO HCPCS SELF ADMINISTERED DRUGS (ALT 637 FOR MEDICARE OP): Performed by: PHYSICIAN ASSISTANT

## 2025-03-10 PROCEDURE — 2500000004 HC RX 250 GENERAL PHARMACY W/ HCPCS (ALT 636 FOR OP/ED): Performed by: STUDENT IN AN ORGANIZED HEALTH CARE EDUCATION/TRAINING PROGRAM

## 2025-03-10 PROCEDURE — 3700000001 HC GENERAL ANESTHESIA TIME - INITIAL BASE CHARGE: Performed by: ORTHOPAEDIC SURGERY

## 2025-03-10 PROCEDURE — 81025 URINE PREGNANCY TEST: CPT | Performed by: ORTHOPAEDIC SURGERY

## 2025-03-10 PROCEDURE — 2500000005 HC RX 250 GENERAL PHARMACY W/O HCPCS: Performed by: NURSE ANESTHETIST, CERTIFIED REGISTERED

## 2025-03-10 PROCEDURE — A27146 PR OSTEOTOMY OF HIP BONE: Performed by: ANESTHESIOLOGY

## 2025-03-10 PROCEDURE — 2500000005 HC RX 250 GENERAL PHARMACY W/O HCPCS: Performed by: PHYSICIAN ASSISTANT

## 2025-03-10 PROCEDURE — 76942 ECHO GUIDE FOR BIOPSY: CPT | Performed by: STUDENT IN AN ORGANIZED HEALTH CARE EDUCATION/TRAINING PROGRAM

## 2025-03-10 PROCEDURE — 2780000003 HC OR 278 NO HCPCS: Performed by: ORTHOPAEDIC SURGERY

## 2025-03-10 PROCEDURE — 3700000002 HC GENERAL ANESTHESIA TIME - EACH INCREMENTAL 1 MINUTE: Performed by: ORTHOPAEDIC SURGERY

## 2025-03-10 PROCEDURE — 0QS504Z REPOSITION LEFT ACETABULUM WITH INTERNAL FIXATION DEVICE, OPEN APPROACH: ICD-10-PCS | Performed by: OBSTETRICS & GYNECOLOGY

## 2025-03-10 PROCEDURE — C1713 ANCHOR/SCREW BN/BN,TIS/BN: HCPCS | Performed by: ORTHOPAEDIC SURGERY

## 2025-03-10 PROCEDURE — A27146 PR OSTEOTOMY OF HIP BONE: Performed by: NURSE ANESTHETIST, CERTIFIED REGISTERED

## 2025-03-10 PROCEDURE — 7100000002 HC RECOVERY ROOM TIME - EACH INCREMENTAL 1 MINUTE: Performed by: ORTHOPAEDIC SURGERY

## 2025-03-10 PROCEDURE — 2500000002 HC RX 250 W HCPCS SELF ADMINISTERED DRUGS (ALT 637 FOR MEDICARE OP, ALT 636 FOR OP/ED): Performed by: PHYSICIAN ASSISTANT

## 2025-03-10 PROCEDURE — 2720000007 HC OR 272 NO HCPCS: Performed by: ORTHOPAEDIC SURGERY

## 2025-03-10 PROCEDURE — 3600000004 HC OR TIME - INITIAL BASE CHARGE - PROCEDURE LEVEL FOUR: Performed by: ORTHOPAEDIC SURGERY

## 2025-03-10 DEVICE — SCREW, SCHANZ, BLUNT TROCAR POINT, 50 MM THREAD, 5 X 200 MM, STAINLESS STEEL: Type: IMPLANTABLE DEVICE | Site: HIP | Status: FUNCTIONAL

## 2025-03-10 DEVICE — NANOTACK TT SUTURE ANCHOR, 1.4MM WITH 1.2MM XBRAID TT
Type: IMPLANTABLE DEVICE | Site: HIP | Status: FUNCTIONAL
Brand: NANOTACK

## 2025-03-10 DEVICE — BLACK MONOFILAMENT NYLON, NONABSORBABLE SURGICAL SUTURE
Type: IMPLANTABLE DEVICE | Site: PELVIS | Status: NON-FUNCTIONAL
Brand: ETHILON

## 2025-03-10 RX ORDER — TRANEXAMIC ACID 100 MG/ML
INJECTION, SOLUTION INTRAVENOUS AS NEEDED
Status: DISCONTINUED | OUTPATIENT
Start: 2025-03-10 | End: 2025-03-10

## 2025-03-10 RX ORDER — MORPHINE SULFATE 2 MG/ML
2 INJECTION, SOLUTION INTRAMUSCULAR; INTRAVENOUS EVERY 6 HOURS SCHEDULED
Status: DISCONTINUED | OUTPATIENT
Start: 2025-03-10 | End: 2025-03-10

## 2025-03-10 RX ORDER — ASPIRIN 325 MG
50000 TABLET, DELAYED RELEASE (ENTERIC COATED) ORAL DAILY
Status: COMPLETED | OUTPATIENT
Start: 2025-03-11 | End: 2025-03-12

## 2025-03-10 RX ORDER — ONDANSETRON HYDROCHLORIDE 2 MG/ML
INJECTION, SOLUTION INTRAVENOUS AS NEEDED
Status: DISCONTINUED | OUTPATIENT
Start: 2025-03-10 | End: 2025-03-10

## 2025-03-10 RX ORDER — LIDOCAINE HYDROCHLORIDE 10 MG/ML
INJECTION, SOLUTION EPIDURAL; INFILTRATION; INTRACAUDAL; PERINEURAL AS NEEDED
Status: DISCONTINUED | OUTPATIENT
Start: 2025-03-10 | End: 2025-03-10

## 2025-03-10 RX ORDER — PANTOPRAZOLE SODIUM 40 MG/1
40 TABLET, DELAYED RELEASE ORAL
Status: DISCONTINUED | OUTPATIENT
Start: 2025-03-11 | End: 2025-03-12 | Stop reason: HOSPADM

## 2025-03-10 RX ORDER — ASPIRIN 81 MG/1
81 TABLET ORAL 2 TIMES DAILY
Qty: 60 TABLET | Refills: 0 | Status: SHIPPED | OUTPATIENT
Start: 2025-03-10 | End: 2025-04-09

## 2025-03-10 RX ORDER — DROPERIDOL 2.5 MG/ML
0.62 INJECTION, SOLUTION INTRAMUSCULAR; INTRAVENOUS ONCE AS NEEDED
Status: DISCONTINUED | OUTPATIENT
Start: 2025-03-10 | End: 2025-03-10 | Stop reason: HOSPADM

## 2025-03-10 RX ORDER — HYDROMORPHONE HYDROCHLORIDE 0.2 MG/ML
0.2 INJECTION INTRAMUSCULAR; INTRAVENOUS; SUBCUTANEOUS EVERY 5 MIN PRN
Status: DISCONTINUED | OUTPATIENT
Start: 2025-03-10 | End: 2025-03-10 | Stop reason: HOSPADM

## 2025-03-10 RX ORDER — ONDANSETRON HYDROCHLORIDE 2 MG/ML
4 INJECTION, SOLUTION INTRAVENOUS EVERY 8 HOURS PRN
Status: DISCONTINUED | OUTPATIENT
Start: 2025-03-10 | End: 2025-03-10

## 2025-03-10 RX ORDER — AMOXICILLIN 250 MG
1 CAPSULE ORAL 2 TIMES DAILY
Qty: 30 TABLET | Refills: 0 | Status: SHIPPED | OUTPATIENT
Start: 2025-03-10 | End: 2025-03-25

## 2025-03-10 RX ORDER — CYCLOBENZAPRINE HCL 10 MG
10 TABLET ORAL 3 TIMES DAILY PRN
Qty: 21 TABLET | Refills: 0 | Status: SHIPPED | OUTPATIENT
Start: 2025-03-10 | End: 2025-03-17

## 2025-03-10 RX ORDER — PROPOFOL 10 MG/ML
INJECTION, EMULSION INTRAVENOUS CONTINUOUS PRN
Status: DISCONTINUED | OUTPATIENT
Start: 2025-03-10 | End: 2025-03-10

## 2025-03-10 RX ORDER — GABAPENTIN 300 MG/1
300 CAPSULE ORAL 3 TIMES DAILY
Status: DISCONTINUED | OUTPATIENT
Start: 2025-03-10 | End: 2025-03-12 | Stop reason: HOSPADM

## 2025-03-10 RX ORDER — ASPIRIN 81 MG/1
81 TABLET ORAL 2 TIMES DAILY
Status: DISCONTINUED | OUTPATIENT
Start: 2025-03-10 | End: 2025-03-12 | Stop reason: HOSPADM

## 2025-03-10 RX ORDER — FENTANYL CITRATE 50 UG/ML
50 INJECTION, SOLUTION INTRAMUSCULAR; INTRAVENOUS ONCE
Status: COMPLETED | OUTPATIENT
Start: 2025-03-10 | End: 2025-03-10

## 2025-03-10 RX ORDER — POLYETHYLENE GLYCOL 3350 17 G/17G
17 POWDER, FOR SOLUTION ORAL 2 TIMES DAILY
Status: DISCONTINUED | OUTPATIENT
Start: 2025-03-10 | End: 2025-03-12 | Stop reason: HOSPADM

## 2025-03-10 RX ORDER — TOBRAMYCIN 1.2 G/30ML
INJECTION, POWDER, LYOPHILIZED, FOR SOLUTION INTRAVENOUS AS NEEDED
Status: DISCONTINUED | OUTPATIENT
Start: 2025-03-10 | End: 2025-03-10 | Stop reason: HOSPADM

## 2025-03-10 RX ORDER — MIDAZOLAM HYDROCHLORIDE 1 MG/ML
2 INJECTION, SOLUTION INTRAMUSCULAR; INTRAVENOUS ONCE
Status: COMPLETED | OUTPATIENT
Start: 2025-03-10 | End: 2025-03-10

## 2025-03-10 RX ORDER — GABAPENTIN 300 MG/1
600 CAPSULE ORAL ONCE
Status: COMPLETED | OUTPATIENT
Start: 2025-03-10 | End: 2025-03-10

## 2025-03-10 RX ORDER — ACETAMINOPHEN 325 MG/1
975 TABLET ORAL ONCE
Status: COMPLETED | OUTPATIENT
Start: 2025-03-10 | End: 2025-03-10

## 2025-03-10 RX ORDER — ROCURONIUM BROMIDE 10 MG/ML
INJECTION, SOLUTION INTRAVENOUS AS NEEDED
Status: DISCONTINUED | OUTPATIENT
Start: 2025-03-10 | End: 2025-03-10

## 2025-03-10 RX ORDER — ONDANSETRON 4 MG/1
4 TABLET, ORALLY DISINTEGRATING ORAL EVERY 8 HOURS PRN
Status: DISCONTINUED | OUTPATIENT
Start: 2025-03-10 | End: 2025-03-12 | Stop reason: HOSPADM

## 2025-03-10 RX ORDER — GABAPENTIN 300 MG/1
300 CAPSULE ORAL 3 TIMES DAILY
Qty: 90 CAPSULE | Refills: 0 | Status: SHIPPED | OUTPATIENT
Start: 2025-03-10 | End: 2025-04-09

## 2025-03-10 RX ORDER — OXYCODONE HYDROCHLORIDE 5 MG/1
5 TABLET ORAL EVERY 4 HOURS PRN
Status: DISCONTINUED | OUTPATIENT
Start: 2025-03-10 | End: 2025-03-10 | Stop reason: HOSPADM

## 2025-03-10 RX ORDER — ONDANSETRON 4 MG/1
4 TABLET, ORALLY DISINTEGRATING ORAL EVERY 8 HOURS PRN
Status: DISCONTINUED | OUTPATIENT
Start: 2025-03-10 | End: 2025-03-10

## 2025-03-10 RX ORDER — CEFAZOLIN SODIUM 2 G/100ML
2 INJECTION, SOLUTION INTRAVENOUS EVERY 8 HOURS
Status: ACTIVE | OUTPATIENT
Start: 2025-03-10 | End: 2025-03-10

## 2025-03-10 RX ORDER — OXYCODONE HYDROCHLORIDE 5 MG/1
5 TABLET ORAL EVERY 4 HOURS PRN
Qty: 42 TABLET | Refills: 0 | Status: SHIPPED | OUTPATIENT
Start: 2025-03-10 | End: 2025-03-17

## 2025-03-10 RX ORDER — ACETAMINOPHEN 325 MG/1
650 TABLET ORAL EVERY 6 HOURS
Qty: 60 TABLET | Refills: 0 | Status: SHIPPED | OUTPATIENT
Start: 2025-03-10 | End: 2025-04-09

## 2025-03-10 RX ORDER — SENNOSIDES 8.6 MG/1
2 TABLET ORAL 2 TIMES DAILY
Status: DISCONTINUED | OUTPATIENT
Start: 2025-03-10 | End: 2025-03-12 | Stop reason: HOSPADM

## 2025-03-10 RX ORDER — OXYCODONE HYDROCHLORIDE 5 MG/1
5 TABLET ORAL EVERY 6 HOURS
Status: DISCONTINUED | OUTPATIENT
Start: 2025-03-10 | End: 2025-03-12 | Stop reason: HOSPADM

## 2025-03-10 RX ORDER — POLYETHYLENE GLYCOL 3350 17 G/17G
17 POWDER, FOR SOLUTION ORAL DAILY
Qty: 10 PACKET | Refills: 0 | Status: SHIPPED | OUTPATIENT
Start: 2025-03-10 | End: 2025-03-20

## 2025-03-10 RX ORDER — ERGOCALCIFEROL 1.25 MG/1
1.25 CAPSULE ORAL
Qty: 8 CAPSULE | Refills: 0 | Status: SHIPPED | OUTPATIENT
Start: 2025-03-16 | End: 2025-05-11

## 2025-03-10 RX ORDER — ONDANSETRON 4 MG/1
4 TABLET, FILM COATED ORAL EVERY 8 HOURS PRN
Qty: 30 TABLET | Refills: 1 | Status: SHIPPED | OUTPATIENT
Start: 2025-03-10 | End: 2025-04-09

## 2025-03-10 RX ORDER — ACETAMINOPHEN 500 MG
10 TABLET ORAL DAILY PRN
Status: DISCONTINUED | OUTPATIENT
Start: 2025-03-10 | End: 2025-03-12 | Stop reason: HOSPADM

## 2025-03-10 RX ORDER — ACETAMINOPHEN 500 MG
1000 TABLET ORAL EVERY 6 HOURS
Status: DISCONTINUED | OUTPATIENT
Start: 2025-03-10 | End: 2025-03-12 | Stop reason: HOSPADM

## 2025-03-10 RX ORDER — CEFAZOLIN SODIUM 2 G/100ML
2 INJECTION, SOLUTION INTRAVENOUS ONCE
Status: COMPLETED | OUTPATIENT
Start: 2025-03-10 | End: 2025-03-10

## 2025-03-10 RX ORDER — CEFAZOLIN SODIUM 2 G/100ML
2 INJECTION, SOLUTION INTRAVENOUS EVERY 8 HOURS
Status: COMPLETED | OUTPATIENT
Start: 2025-03-11 | End: 2025-03-11

## 2025-03-10 RX ORDER — ONDANSETRON HYDROCHLORIDE 2 MG/ML
4 INJECTION, SOLUTION INTRAVENOUS ONCE AS NEEDED
Status: DISCONTINUED | OUTPATIENT
Start: 2025-03-10 | End: 2025-03-10 | Stop reason: HOSPADM

## 2025-03-10 RX ORDER — CELECOXIB 100 MG/1
100 CAPSULE ORAL DAILY
Qty: 10 CAPSULE | Refills: 0 | Status: SHIPPED | OUTPATIENT
Start: 2025-03-10 | End: 2025-03-20

## 2025-03-10 RX ORDER — LOSARTAN POTASSIUM 25 MG/1
12.5 TABLET ORAL DAILY
Status: DISCONTINUED | OUTPATIENT
Start: 2025-03-11 | End: 2025-03-12 | Stop reason: HOSPADM

## 2025-03-10 RX ORDER — VANCOMYCIN HYDROCHLORIDE 1 G/20ML
INJECTION, POWDER, LYOPHILIZED, FOR SOLUTION INTRAVENOUS AS NEEDED
Status: DISCONTINUED | OUTPATIENT
Start: 2025-03-10 | End: 2025-03-10 | Stop reason: HOSPADM

## 2025-03-10 RX ORDER — METHOCARBAMOL 100 MG/ML
1000 INJECTION, SOLUTION INTRAMUSCULAR; INTRAVENOUS ONCE
Status: COMPLETED | OUTPATIENT
Start: 2025-03-10 | End: 2025-03-10

## 2025-03-10 RX ORDER — CELECOXIB 200 MG/1
200 CAPSULE ORAL DAILY
Status: DISCONTINUED | OUTPATIENT
Start: 2025-03-11 | End: 2025-03-12 | Stop reason: HOSPADM

## 2025-03-10 RX ORDER — ALBUTEROL SULFATE 0.83 MG/ML
2.5 SOLUTION RESPIRATORY (INHALATION) ONCE AS NEEDED
Status: DISCONTINUED | OUTPATIENT
Start: 2025-03-10 | End: 2025-03-10 | Stop reason: HOSPADM

## 2025-03-10 RX ORDER — FENTANYL CITRATE 50 UG/ML
INJECTION, SOLUTION INTRAMUSCULAR; INTRAVENOUS AS NEEDED
Status: DISCONTINUED | OUTPATIENT
Start: 2025-03-10 | End: 2025-03-10

## 2025-03-10 RX ORDER — ORPHENADRINE CITRATE 100 MG/1
100 TABLET, EXTENDED RELEASE ORAL 2 TIMES DAILY
Status: DISCONTINUED | OUTPATIENT
Start: 2025-03-10 | End: 2025-03-12 | Stop reason: HOSPADM

## 2025-03-10 RX ORDER — MORPHINE SULFATE 2 MG/ML
2 INJECTION, SOLUTION INTRAMUSCULAR; INTRAVENOUS EVERY 6 HOURS SCHEDULED
Status: COMPLETED | OUTPATIENT
Start: 2025-03-10 | End: 2025-03-12

## 2025-03-10 RX ORDER — ACETAMINOPHEN 325 MG/1
650 TABLET ORAL EVERY 4 HOURS PRN
Status: DISCONTINUED | OUTPATIENT
Start: 2025-03-10 | End: 2025-03-10 | Stop reason: HOSPADM

## 2025-03-10 RX ORDER — ONDANSETRON HYDROCHLORIDE 2 MG/ML
4 INJECTION, SOLUTION INTRAVENOUS EVERY 4 HOURS PRN
Status: DISCONTINUED | OUTPATIENT
Start: 2025-03-10 | End: 2025-03-12 | Stop reason: HOSPADM

## 2025-03-10 RX ORDER — PANTOPRAZOLE SODIUM 40 MG/1
40 TABLET, DELAYED RELEASE ORAL
Qty: 30 TABLET | Refills: 0 | Status: SHIPPED | OUTPATIENT
Start: 2025-03-10 | End: 2025-04-09

## 2025-03-10 RX ORDER — OXYCODONE HYDROCHLORIDE 5 MG/1
10 TABLET ORAL EVERY 4 HOURS PRN
Status: DISCONTINUED | OUTPATIENT
Start: 2025-03-10 | End: 2025-03-10 | Stop reason: HOSPADM

## 2025-03-10 RX ADMIN — LIDOCAINE HYDROCHLORIDE 5 ML: 10 INJECTION, SOLUTION EPIDURAL; INFILTRATION; INTRACAUDAL; PERINEURAL at 15:44

## 2025-03-10 RX ADMIN — GABAPENTIN 300 MG: 300 CAPSULE ORAL at 21:13

## 2025-03-10 RX ADMIN — ROCURONIUM BROMIDE 30 MG: 10 INJECTION, SOLUTION INTRAVENOUS at 12:39

## 2025-03-10 RX ADMIN — HYDROMORPHONE HYDROCHLORIDE 0.2 MG: 0.2 INJECTION, SOLUTION INTRAMUSCULAR; INTRAVENOUS; SUBCUTANEOUS at 16:19

## 2025-03-10 RX ADMIN — ORPHENADRINE CITRATE 100 MG: 100 TABLET, EXTENDED RELEASE ORAL at 21:13

## 2025-03-10 RX ADMIN — GABAPENTIN 600 MG: 300 CAPSULE ORAL at 10:25

## 2025-03-10 RX ADMIN — ACETAMINOPHEN 1000 MG: 500 TABLET, FILM COATED ORAL at 21:13

## 2025-03-10 RX ADMIN — LIDOCAINE HYDROCHLORIDE 5 ML: 10 INJECTION, SOLUTION EPIDURAL; INFILTRATION; INTRACAUDAL; PERINEURAL at 12:27

## 2025-03-10 RX ADMIN — CEFAZOLIN SODIUM 2 G: 2 INJECTION, SOLUTION INTRAVENOUS at 12:12

## 2025-03-10 RX ADMIN — PROPOFOL 200 MG: 10 INJECTION, EMULSION INTRAVENOUS at 12:27

## 2025-03-10 RX ADMIN — MIDAZOLAM 2 MG: 1 INJECTION INTRAMUSCULAR; INTRAVENOUS at 10:54

## 2025-03-10 RX ADMIN — FENTANYL CITRATE 50 MCG: 0.05 INJECTION, SOLUTION INTRAMUSCULAR; INTRAVENOUS at 10:54

## 2025-03-10 RX ADMIN — CEFAZOLIN SODIUM 2 G: 2 INJECTION, SOLUTION INTRAVENOUS at 16:05

## 2025-03-10 RX ADMIN — Medication 2 L/MIN: at 17:41

## 2025-03-10 RX ADMIN — ROCURONIUM BROMIDE 10 MG: 10 INJECTION, SOLUTION INTRAVENOUS at 14:53

## 2025-03-10 RX ADMIN — ROCURONIUM BROMIDE 20 MG: 10 INJECTION, SOLUTION INTRAVENOUS at 14:25

## 2025-03-10 RX ADMIN — PROPOFOL 50 MCG/KG/MIN: 10 INJECTION, EMULSION INTRAVENOUS at 12:30

## 2025-03-10 RX ADMIN — SUGAMMADEX 200 MG: 100 INJECTION, SOLUTION INTRAVENOUS at 15:47

## 2025-03-10 RX ADMIN — DEXAMETHASONE SODIUM PHOSPHATE 4 MG: 4 INJECTION, SOLUTION INTRAMUSCULAR; INTRAVENOUS at 12:28

## 2025-03-10 RX ADMIN — ASPIRIN 81 MG: 81 TABLET, COATED ORAL at 21:13

## 2025-03-10 RX ADMIN — SODIUM CHLORIDE, POTASSIUM CHLORIDE, SODIUM LACTATE AND CALCIUM CHLORIDE: 600; 310; 30; 20 INJECTION, SOLUTION INTRAVENOUS at 12:12

## 2025-03-10 RX ADMIN — SODIUM CHLORIDE, POTASSIUM CHLORIDE, SODIUM LACTATE AND CALCIUM CHLORIDE: 600; 310; 30; 20 INJECTION, SOLUTION INTRAVENOUS at 15:22

## 2025-03-10 RX ADMIN — TRANEXAMIC ACID 1000 MG: 100 INJECTION INTRAVENOUS at 14:19

## 2025-03-10 RX ADMIN — METHOCARBAMOL 1000 MG: 100 INJECTION, SOLUTION INTRAMUSCULAR; INTRAVENOUS at 16:28

## 2025-03-10 RX ADMIN — MORPHINE SULFATE 2 MG: 2 INJECTION, SOLUTION INTRAMUSCULAR; INTRAVENOUS at 18:53

## 2025-03-10 RX ADMIN — FENTANYL CITRATE 100 MCG: 50 INJECTION INTRAMUSCULAR; INTRAVENOUS at 13:04

## 2025-03-10 RX ADMIN — ROCURONIUM BROMIDE 20 MG: 10 INJECTION, SOLUTION INTRAVENOUS at 13:46

## 2025-03-10 RX ADMIN — ACETAMINOPHEN 975 MG: 325 TABLET ORAL at 10:24

## 2025-03-10 RX ADMIN — ONDANSETRON 4 MG: 2 INJECTION INTRAMUSCULAR; INTRAVENOUS at 15:29

## 2025-03-10 RX ADMIN — FENTANYL CITRATE 100 MCG: 50 INJECTION INTRAMUSCULAR; INTRAVENOUS at 12:28

## 2025-03-10 RX ADMIN — SENNOSIDES 17.2 MG: 8.6 TABLET, FILM COATED ORAL at 21:13

## 2025-03-10 RX ADMIN — ROCURONIUM BROMIDE 20 MG: 10 INJECTION, SOLUTION INTRAVENOUS at 13:04

## 2025-03-10 RX ADMIN — WHITE PETROLATUM 57.7 %-MINERAL OIL 31.9 % EYE OINTMENT 1 APPLICATION: at 12:28

## 2025-03-10 RX ADMIN — OXYCODONE 5 MG: 5 TABLET ORAL at 21:13

## 2025-03-10 RX ADMIN — ROCURONIUM BROMIDE 50 MG: 10 INJECTION, SOLUTION INTRAVENOUS at 12:27

## 2025-03-10 RX ADMIN — POLYETHYLENE GLYCOL 3350 17 G: 17 POWDER, FOR SOLUTION ORAL at 21:13

## 2025-03-10 SDOH — SOCIAL STABILITY: SOCIAL INSECURITY: DOES ANYONE TRY TO KEEP YOU FROM HAVING/CONTACTING OTHER FRIENDS OR DOING THINGS OUTSIDE YOUR HOME?: NO

## 2025-03-10 SDOH — ECONOMIC STABILITY: HOUSING INSECURITY: AT ANY TIME IN THE PAST 12 MONTHS, WERE YOU HOMELESS OR LIVING IN A SHELTER (INCLUDING NOW)?: NO

## 2025-03-10 SDOH — SOCIAL STABILITY: SOCIAL INSECURITY
WITHIN THE LAST YEAR, HAVE YOU BEEN KICKED, HIT, SLAPPED, OR OTHERWISE PHYSICALLY HURT BY YOUR PARTNER OR EX-PARTNER?: NO

## 2025-03-10 SDOH — SOCIAL STABILITY: SOCIAL INSECURITY: HAS ANYONE EVER THREATENED TO HURT YOUR FAMILY OR YOUR PETS?: NO

## 2025-03-10 SDOH — SOCIAL STABILITY: SOCIAL INSECURITY: WITHIN THE LAST YEAR, HAVE YOU BEEN AFRAID OF YOUR PARTNER OR EX-PARTNER?: NO

## 2025-03-10 SDOH — ECONOMIC STABILITY: INCOME INSECURITY: IN THE PAST 12 MONTHS HAS THE ELECTRIC, GAS, OIL, OR WATER COMPANY THREATENED TO SHUT OFF SERVICES IN YOUR HOME?: NO

## 2025-03-10 SDOH — ECONOMIC STABILITY: HOUSING INSECURITY: IN THE LAST 12 MONTHS, WAS THERE A TIME WHEN YOU WERE NOT ABLE TO PAY THE MORTGAGE OR RENT ON TIME?: NO

## 2025-03-10 SDOH — HEALTH STABILITY: MENTAL HEALTH: HOW OFTEN DO YOU HAVE A DRINK CONTAINING ALCOHOL?: 2-4 TIMES A MONTH

## 2025-03-10 SDOH — ECONOMIC STABILITY: FOOD INSECURITY: HOW HARD IS IT FOR YOU TO PAY FOR THE VERY BASICS LIKE FOOD, HOUSING, MEDICAL CARE, AND HEATING?: NOT VERY HARD

## 2025-03-10 SDOH — HEALTH STABILITY: MENTAL HEALTH: HOW OFTEN DO YOU HAVE SIX OR MORE DRINKS ON ONE OCCASION?: NEVER

## 2025-03-10 SDOH — ECONOMIC STABILITY: TRANSPORTATION INSECURITY: IN THE PAST 12 MONTHS, HAS LACK OF TRANSPORTATION KEPT YOU FROM MEDICAL APPOINTMENTS OR FROM GETTING MEDICATIONS?: NO

## 2025-03-10 SDOH — SOCIAL STABILITY: SOCIAL INSECURITY: WITHIN THE LAST YEAR, HAVE YOU BEEN HUMILIATED OR EMOTIONALLY ABUSED IN OTHER WAYS BY YOUR PARTNER OR EX-PARTNER?: NO

## 2025-03-10 SDOH — SOCIAL STABILITY: SOCIAL INSECURITY: HAVE YOU HAD THOUGHTS OF HARMING ANYONE ELSE?: NO

## 2025-03-10 SDOH — HEALTH STABILITY: MENTAL HEALTH: HOW MANY DRINKS CONTAINING ALCOHOL DO YOU HAVE ON A TYPICAL DAY WHEN YOU ARE DRINKING?: 1 OR 2

## 2025-03-10 SDOH — SOCIAL STABILITY: SOCIAL INSECURITY: WERE YOU ABLE TO COMPLETE ALL THE BEHAVIORAL HEALTH SCREENINGS?: YES

## 2025-03-10 SDOH — SOCIAL STABILITY: SOCIAL INSECURITY: ARE YOU OR HAVE YOU BEEN THREATENED OR ABUSED PHYSICALLY, EMOTIONALLY, OR SEXUALLY BY ANYONE?: NO

## 2025-03-10 SDOH — ECONOMIC STABILITY: FOOD INSECURITY: WITHIN THE PAST 12 MONTHS, YOU WORRIED THAT YOUR FOOD WOULD RUN OUT BEFORE YOU GOT THE MONEY TO BUY MORE.: NEVER TRUE

## 2025-03-10 SDOH — SOCIAL STABILITY: SOCIAL INSECURITY: DO YOU FEEL ANYONE HAS EXPLOITED OR TAKEN ADVANTAGE OF YOU FINANCIALLY OR OF YOUR PERSONAL PROPERTY?: NO

## 2025-03-10 SDOH — ECONOMIC STABILITY: FOOD INSECURITY: WITHIN THE PAST 12 MONTHS, THE FOOD YOU BOUGHT JUST DIDN'T LAST AND YOU DIDN'T HAVE MONEY TO GET MORE.: NEVER TRUE

## 2025-03-10 SDOH — SOCIAL STABILITY: SOCIAL INSECURITY
WITHIN THE LAST YEAR, HAVE YOU BEEN RAPED OR FORCED TO HAVE ANY KIND OF SEXUAL ACTIVITY BY YOUR PARTNER OR EX-PARTNER?: NO

## 2025-03-10 SDOH — SOCIAL STABILITY: SOCIAL INSECURITY: DO YOU FEEL UNSAFE GOING BACK TO THE PLACE WHERE YOU ARE LIVING?: NO

## 2025-03-10 SDOH — SOCIAL STABILITY: SOCIAL INSECURITY: ABUSE: ADULT

## 2025-03-10 SDOH — ECONOMIC STABILITY: HOUSING INSECURITY: IN THE PAST 12 MONTHS, HOW MANY TIMES HAVE YOU MOVED WHERE YOU WERE LIVING?: 0

## 2025-03-10 SDOH — SOCIAL STABILITY: SOCIAL INSECURITY
ASK PARENT OR GUARDIAN: ARE THERE TIMES WHEN YOU, YOUR CHILD(REN), OR ANY MEMBER OF YOUR HOUSEHOLD FEEL UNSAFE, HARMED, OR THREATENED AROUND PERSONS WITH WHOM YOU KNOW OR LIVE?: NO

## 2025-03-10 SDOH — SOCIAL STABILITY: SOCIAL INSECURITY: ARE THERE ANY APPARENT SIGNS OF INJURIES/BEHAVIORS THAT COULD BE RELATED TO ABUSE/NEGLECT?: NO

## 2025-03-10 ASSESSMENT — ACTIVITIES OF DAILY LIVING (ADL)
ASSISTIVE_DEVICE: WALKER
GROOMING: INDEPENDENT
TOILETING: INDEPENDENT
DRESSING YOURSELF: INDEPENDENT
BATHING: INDEPENDENT
JUDGMENT_ADEQUATE_SAFELY_COMPLETE_DAILY_ACTIVITIES: YES
LACK_OF_TRANSPORTATION: NO
HEARING - LEFT EAR: FUNCTIONAL
ADEQUATE_TO_COMPLETE_ADL: YES
FEEDING YOURSELF: INDEPENDENT
HEARING - RIGHT EAR: FUNCTIONAL
WALKS IN HOME: INDEPENDENT
PATIENT'S MEMORY ADEQUATE TO SAFELY COMPLETE DAILY ACTIVITIES?: YES

## 2025-03-10 ASSESSMENT — LIFESTYLE VARIABLES
AUDIT-C TOTAL SCORE: 2
SKIP TO QUESTIONS 9-10: 1

## 2025-03-10 ASSESSMENT — PAIN - FUNCTIONAL ASSESSMENT
PAIN_FUNCTIONAL_ASSESSMENT: 0-10
PAIN_FUNCTIONAL_ASSESSMENT: WONG-BAKER FACES
PAIN_FUNCTIONAL_ASSESSMENT: 0-10
PAIN_FUNCTIONAL_ASSESSMENT: 0-10
PAIN_FUNCTIONAL_ASSESSMENT: WONG-BAKER FACES
PAIN_FUNCTIONAL_ASSESSMENT: 0-10
PAIN_FUNCTIONAL_ASSESSMENT: WONG-BAKER FACES
PAIN_FUNCTIONAL_ASSESSMENT: WONG-BAKER FACES
PAIN_FUNCTIONAL_ASSESSMENT: 0-10
PAIN_FUNCTIONAL_ASSESSMENT: FLACC (FACE, LEGS, ACTIVITY, CRY, CONSOLABILITY)
PAIN_FUNCTIONAL_ASSESSMENT: WONG-BAKER FACES
PAIN_FUNCTIONAL_ASSESSMENT: 0-10
PAIN_FUNCTIONAL_ASSESSMENT: WONG-BAKER FACES

## 2025-03-10 ASSESSMENT — PAIN SCALES - GENERAL
PAINLEVEL_OUTOF10: 3
PAINLEVEL_OUTOF10: 0 - NO PAIN
PAIN_LEVEL: 6
PAINLEVEL_OUTOF10: 3
PAINLEVEL_OUTOF10: 0 - NO PAIN
PAINLEVEL_OUTOF10: 4
PAINLEVEL_OUTOF10: 6
PAINLEVEL_OUTOF10: 0 - NO PAIN
PAINLEVEL_OUTOF10: 0 - NO PAIN
PAINLEVEL_OUTOF10: 4
PAINLEVEL_OUTOF10: 4
PAINLEVEL_OUTOF10: 5 - MODERATE PAIN
PAINLEVEL_OUTOF10: 0 - NO PAIN
PAINLEVEL_OUTOF10: 6
PAINLEVEL_OUTOF10: 5 - MODERATE PAIN
PAINLEVEL_OUTOF10: 6
PAINLEVEL_OUTOF10: 6
PAINLEVEL_OUTOF10: 5 - MODERATE PAIN
PAINLEVEL_OUTOF10: 0 - NO PAIN
PAINLEVEL_OUTOF10: 6
PAINLEVEL_OUTOF10: 0 - NO PAIN
PAINLEVEL_OUTOF10: 4

## 2025-03-10 ASSESSMENT — COGNITIVE AND FUNCTIONAL STATUS - GENERAL
DRESSING REGULAR LOWER BODY CLOTHING: A LITTLE
MOBILITY SCORE: 19
PATIENT BASELINE BEDBOUND: NO
MOBILITY SCORE: 19
MOVING TO AND FROM BED TO CHAIR: A LITTLE
CLIMB 3 TO 5 STEPS WITH RAILING: A LITTLE
STANDING UP FROM CHAIR USING ARMS: A LITTLE
STANDING UP FROM CHAIR USING ARMS: A LITTLE
CLIMB 3 TO 5 STEPS WITH RAILING: A LITTLE
TOILETING: A LITTLE
HELP NEEDED FOR BATHING: A LITTLE
WALKING IN HOSPITAL ROOM: A LITTLE
DRESSING REGULAR LOWER BODY CLOTHING: A LITTLE
TURNING FROM BACK TO SIDE WHILE IN FLAT BAD: A LITTLE
WALKING IN HOSPITAL ROOM: A LITTLE
TOILETING: A LITTLE
TURNING FROM BACK TO SIDE WHILE IN FLAT BAD: A LITTLE
DAILY ACTIVITIY SCORE: 22
DAILY ACTIVITIY SCORE: 21
MOVING TO AND FROM BED TO CHAIR: A LITTLE

## 2025-03-10 ASSESSMENT — PATIENT HEALTH QUESTIONNAIRE - PHQ9
2. FEELING DOWN, DEPRESSED OR HOPELESS: NOT AT ALL
SUM OF ALL RESPONSES TO PHQ9 QUESTIONS 1 & 2: 0
1. LITTLE INTEREST OR PLEASURE IN DOING THINGS: NOT AT ALL

## 2025-03-10 ASSESSMENT — COLUMBIA-SUICIDE SEVERITY RATING SCALE - C-SSRS
2. HAVE YOU ACTUALLY HAD ANY THOUGHTS OF KILLING YOURSELF?: NO
1. IN THE PAST MONTH, HAVE YOU WISHED YOU WERE DEAD OR WISHED YOU COULD GO TO SLEEP AND NOT WAKE UP?: NO
6. HAVE YOU EVER DONE ANYTHING, STARTED TO DO ANYTHING, OR PREPARED TO DO ANYTHING TO END YOUR LIFE?: NO

## 2025-03-10 ASSESSMENT — PAIN DESCRIPTION - LOCATION: LOCATION: HIP

## 2025-03-10 ASSESSMENT — PAIN DESCRIPTION - DESCRIPTORS
DESCRIPTORS: ACHING

## 2025-03-10 ASSESSMENT — PAIN DESCRIPTION - ORIENTATION: ORIENTATION: LEFT

## 2025-03-10 NOTE — ANESTHESIA PREPROCEDURE EVALUATION
Patient: Justina Vela    Procedure Information       Date/Time: 03/10/25 1100    Procedures:       OSTEOTOMY, PELVIS (Left: Pelvis)      Reconstruction Acetabulum (Eric instruments, eric screws,, totaljoint saw, stilettosteotomies, scope instruments, guardian) (Left: Hip)    Location: [facility] / [facility]    Surgeons: Kwadwo Brooks MD; Silas Hansen MD            Relevant Problems   Anesthesia (within normal limits)       Clinical information reviewed:   Tobacco  Allergies  Meds   Med Hx  Surg Hx  OB Status  Fam Hx  Soc   Hx        NPO Detail:  NPO/Void Status  Date of Last Liquid: 03/09/25  Time of Last Liquid: 0700 (5oz water)  Date of Last Solid: 03/09/25  Time of Last Solid: 2330  Last Intake Type: Food  Time of Last Void: 1000         Physical Exam    Airway  Mallampati: II  TM distance: >3 FB  Neck ROM: full     Cardiovascular - normal exam     Dental - normal exam     Pulmonary - normal exam  Breath sounds clear to auscultation     Abdominal - normal exam             Anesthesia Plan    History of general anesthesia?: yes  History of complications of general anesthesia?: no    ASA 1     general     intravenous induction   Postoperative administration of opioids is intended.  Trial extubation is planned.    Plan discussed with CAA and CRNA.

## 2025-03-10 NOTE — NURSING NOTE
Admitted to 202 from surgery following left hip surgery.Bulky dressing intact with no noted drainage.2+ pedal pulses.Capillary refill adequate. Family at bedside.

## 2025-03-10 NOTE — NURSING NOTE
Patient meets criteria for discharge to division. Patient VSS while in pacu. 16 Fr rush draining dark yellow urine. Pain tolerable at 4/10. Patient resting comfortably.

## 2025-03-10 NOTE — NURSING NOTE
Medicated with scheduled morphine for complaint of surgical discomfort.No change in circulatory status.Family remains at bedside.

## 2025-03-10 NOTE — ANESTHESIA PROCEDURE NOTES
Airway  Date/Time: 3/10/2025 12:30 PM  Urgency: elective    Airway not difficult    Staffing  Performed: attending   Authorized by: Philip Plunkett DO    Performed by: NGHIA Pizano-LASHAWN  Patient location during procedure: OR    Indications and Patient Condition  Indications for airway management: anesthesia  Spontaneous Ventilation: absent  Sedation level: deep  Preoxygenated: yes  Patient position: sniffing  Mask difficulty assessment: 0 - not attempted    Final Airway Details  Final airway type: endotracheal airway      Successful airway: ETT  Cuffed: yes   Successful intubation technique: video laryngoscopy  Facilitating devices/methods: intubating stylet  Endotracheal tube insertion site: oral  Blade: Jonathan  Blade size: #3  ETT size (mm): 6.5  Cormack-Lehane Classification: grade I - full view of glottis  Placement verified by: chest auscultation and capnometry   Cuff volume (mL): 8  Measured from: teeth  ETT to teeth (cm): 21  Number of attempts at approach: 1

## 2025-03-10 NOTE — DISCHARGE INSTRUCTIONS
MD Meg David [facility], [place]  ROLF/Hip Reconstruction Surgery  Phone: [phone]     Fax:[phone]              DISCHARGE INSTRUCTIONS      PLEASE READ CAREFULLY BEFORE CONTACTING YOUR PROVIDER.    [employer] IS THE PREFERRED COMMUNICATION FOR ALL TEAM MEMBERS.    POSTOPERATIVE INSTRUCTIONS: PERIACETABULAR OSTEOTOMY (ROLF)    DYSPLASIA CARE TEAM  Please use the information below to contact your care team following surgery.  If you are leaving a message or using the [employer] portal, please include your full name, date of birth and date of surgery so that we can correctly identify you.  Your call will be returned within 1-2 business days, please do not leave multiple messages with other staff regarding a single issue while you are awaiting a return call.     Who to call Contact Information Matters needing handled   Meg Coyle PA-C  Physician Assistant [employer] Portal   Prescription Refills   Medical questions/concerns       Laury Valencia -               [phone]         Prescription Refills  Scheduling office Visits  Medical questions/concerns  Leave of Absence or other paperwork  Any concerns more than 6 weeks from surgery - an appointment will need to be made     MEDICATION REFILLS - Meg Coyle PA-C ([employer]) or Laury Valencia ([phone])    -You will NOT receive a call indicating that your prescription has been filled.  Please contact your pharmacy with any questions.    Medication refills will be filled Monday-Friday 7am to 1pm ONLY. Please call the office or send a [employer] message for a refill request.  Any requests received outside of this timeframe will be handled on the next business day.  Please do not call multiple times or call other members of the care team for medication needs, this will cause the refill to take longer.    Per State and Institutional policy, pain medications can only be refilled every 7 days for up to six weeks following  surgery.    My Chart Portal: If you are using the My Chart portal and are requesting a medication refill, please list what type of surgery you had and left or right side, medication that needs refilled, and pharmacy you would like your medication sent.     WEIGHT BEARING - 30 pounds foot flat weight bearing taught by physical therapy    ACTIVITY - Limit your activities and rest until first post-operative follow up    DRIVING & TRAVEL AFTER SURGERY   Patients should anticipate waiting at least 4-6 weeks before traveling long distances after surgery, or unless discussed with Dr. Brooks.  You will need to stop to walk around ever 1 hour during your travel to help with blood clot prevention.    Patients may not drive until cleared by the MD/PA or the office and you are off of all narcotics.    WOUND CARE  You may shower with waterproof dressing on. Your surgical bandage will be removed by yourself/care giver 1 week after surgery. If you have sutures in place from the hip scope, these will be removed in the office at 2 week follow up. Once the dressing is removed, you may continue to shower. Let soap and water wash over the wound. DO NOT SCRUB.  Do not soak in a bath tub, hot tub, pool or lake until you are 8 weeks out from surgery.  Do not apply lotions, creams or ointments until you are 6 weeks out from surgery.    PAIN, SWELLING, BRUISING & CLICKING  Pain and swelling are a natural part of your recovery which is considered normal for up to a year after surgery.  Symptoms may be treated with movement, ice, compression stockings, elevating your leg, and by following the pain medication regimen as prescribed.  Bruising is normal for several weeks after surgery. You may ice areas that are tender to help with discomfort.  Pain and swelling may temporarily increase with an increase in activity or exercise.  Use ice after activity.  You may also feel decreased sensation or numbness near the incision site and the front/outer  side of the thigh.  This is normal and sensation may improve over time.    PERSONAL HYGIENE  You may shower upon discharging from the hospital.  Soap and water is permitted to run over the surgical dressing.  Do not scrub directly over these bandages.  DO NOT soak your incision in a bath, hot tub, pool or pond/lake for a minimum of 8 weeks following your surgery.  DO NOT use lotions, creams, ointments on your wound for a minimum of 6 weeks following your surgery. At that time you may use vitamin E to assist with softening of your incision.      RESTARTING HOME ROUTINE - DIET & MEDICATIONS  Post-operative constipation can result due to a combination of inactivity, anesthesia and pain medication. To help prevent this, you should increase your water and fiber intake. Physical activity such as walking will also help stimulate the bowels.   You may resume your normal diet when you discharge home.  Choose foods that help promote good bowel habits and prevent constipation, such as foods high in fiber.  You may restart your home medications the following day after your surgery UNLESS you have been given alternate instructions.  Follow the instructions given to you on your hospital discharge instructions for more information regarding your home medications.      OUTPATIENT PHYSICAL THERAPY  Outpatient physical therapy will start after you are seen in the office for 2 week post-operative check  You may choose any outpatient physical therapy location.      EMERGENCIES - WHEN TO CONTACT THE SURGEON'S OFFICE IMMEDIATELY  Fever >101 with chills that has been present for at least 48 hours.   Excessive bleeding from incision that will not slow down. A small amount of drainage is normal and expected.  Once pressure is applied and the area is covered, do not continue to check the area regularly.  This will remove pressure and bleeding will continue.  Leave in place for 4-6 hours.  Signs of infection of incision-excessive drainage  that is soaking through your dressing (especially if it is pus-like), redness that is spreading out from the edges of your incision, or increased warmth around the area.  Excruciating pain for which the pain medication, taken as instructed, is not helping.  Severe calf pain.  Go directly to the emergency room or call 911, if you are experiencing chest pain or difficulty breathing.    ICE & COLD THERAPY INSTRUCTIONS    To assist with pain control and post-op swelling, you should be using ice regularly throughout recovery, especially for the first 6 weeks, regardless of the cold therapy method you use.      Always make sure there is a layer of protection between the cold pad and your skin.    If you are using ICE PACKS or GEL PACKS, you will need to alternate 20 minutes on, 20 minutes off twice per hour.    If you are using an ICE MACHINE, please follow the provided ice machine instructions.  These devices differ from ice or ice packs whereas the mechanism circulates water through tubing and a pad to provide longer periods of cold therapy to the desired site.  You can use your cold devices around the clock for optimal comfort.  We recommend using cold therapy after working with therapy or completing exercises on your own.  There is no set schedule in which you must follow while using cold therapy.  Below are a few points to remember when using a cold therapy device:    You do not need to need to use the 20 on, 20 off method.  Detach the pad from the cooler and ambulate at least once every hour.  You can check your skin under the pad at this time.  You may wear the cold therapy device during periods of sleep including overnight.  If you wake up during the night, you can check the skin at this time.  You do not need to wake up specifically to perform skin checks.  Empty the cooler and pad when device is not in use.  Follow 's instructions for cleaning your cold therapy device.    DISCHARGE MEDICATIONS -  Please reference the sample schedule on the reverse side for instructions on how to best schedule medications.    PAIN MEDICATION    ___X_ Oxycodone  Oxycodone has been prescribed for post-operative pain control.    This medications will only be refilled ONCE every 7 days for a period of up to 6 weeks following surgery.  After 6 weeks, you will transition to acetaminophen and over -the- counter anti-inflammatories such as Ibuprofen, Advil or Aleve in conjunction with ICE/COLD THERAPY.   Side effects may be constipation and nausea, vomiting, sleepiness, dizziness, lightheadedness, headache, blurred vision, dry mouth sweating, itching (if you have itching, over-the -counter Benadryl can be used as needed).  You may NOT operate a motor vehicle while taking these medications or have been cleared by your care team.     ___X_ Acetaminophen (Tylenol)  Acetaminophen has been prescribed as an adjunct for pain control. Take two 500 mg tablets every 6 hours for 4 weeks. You will not receive a refill on this medication.  Do not exceed 4000mg of acetaminophen within a 24 hour period.  Side effects may include nausea, heartburn, drowsiness, and headache.    ___X_ Cyclobenzaprine (Flexeril)  This medications has been prescribed as an adjunct for pain control caused by muscle spasm/cramping  Side effects may be sleepiness, dizziness, lightheadedness, headache, blurred vision, etc. Do not take this medication at the same time as the Tramadol/Oxycodone as it may cause excessive sleepiness  You may NOT operate a motor vehicle while taking these medications or have been cleared by your care team.    BLOOD THINNER    ___X_ Aspirin  This blood thinner has been prescribed to prevent blood clots in your leg or lungs. Take as prescribed on the bottle for 4 weeks. You will not receive a refill on this medication.      ANTI NAUSEA    ___X_ Pantoprazole - Proton Pump Inhibitor (PPI) - Stomach Acid Reduction Medication  If you are already on a  PPI, you will continue your regular medication. If you are not, you will be prescribed Pantoprazole to help with nausea and protect your stomach while taking pain medication.  You will not receive a refill on this medication.  ___X_ Ondansetron (Zofran)  This medication has been prescribed to help with post-operative nausea and vomiting. It is normal to experience these symptoms for 1-2 weeks after surgery as your body recovers from the anesthesia and surgery.   Take as needed to help with the discomfort of nausea and to prevent vomiting.     STOOL SOFTENERS    ___X_ Senna Plus (Senna and Colace) and Miralax  Take medication to help with constipation while using the Oxycodone and Tramadol for pain control.  You will not receive a refill on this medication.    Continued Constipation  If you continue to be constipated despite daily use of Miralax and Senna Plus, you try an over-the-counter Dulcolax Suppository and use per instructions on the package.       NEUROPATHIC PAIN MEDICATIONS  ___X_ Gabapentin (Neurontin) or Pregabalin (Lyrica)  This medications has been prescribed for post-operative pain control caused from nerve pain   This medication should be taken three times a day until you are seen in the office for first post-operative check  Side effects may be nausea, vomiting, sleepiness, dizziness, lightheadedness, headache, blurred vision, dry mouth sweating, itching (if you have itching, over-the -counter Benadryl can be used as needed).  You may NOT operate a motor vehicle while taking these medications or have been cleared by your care team.    JOINT CAPSULE ADHESION/HETEROTOPIC OSSIFICATION PREVENTION  ___X_ Celebrex  This medications has been prescribe to prevent heterotopic ossification (the overgrowth of bone in unwanted places)  You will take this medication once a day until gone with no refills needed  ___X_ Losartan ****  This medications has been prescribed for the prevention of joint capsule adhesion  formation (excess scar tissue development)  This medication should be taken daily until gone  Side effects may include lightheadedness, blurred vision, or dizziness; if you experience any of these discontinue the use of this medication and call the office     SUPPLEMENTATION  ___X_ Cholecalciferol (Vitamin D)   This medications has been prescribe to help with bone healing  You will take this medication once a week until gone with no refills needed    You will not receive refills on the following medications.   Acetaminophen (Tylenol  Celebrex  Losartan  Miralax  Colace  Pantoprazole  Blood Thinner  Vitamin D    Pain Medication Refills -[phone] or MyChart- Monday through Friday 7am-1pm    FOLLOW-UP- You should have an appointment with a provider in 2 weeks.         SAMPLE              The times below are an example of how to organize medications to optimize pain control  Your actual medication schedule may vary based on your last dose taken IN THE HOSPITAL        Time 3:00 am 6:00 am 9:00 am 12:00 pm 3:00 pm 6:00 pm 9:00 pm 12:00 am   Medications Oxycodone Tylenol  Flexeril  Miralax   Blood Thinner  Colace  Pantoprazole or other PPI  Oxycodone  Meloxicam Tylenol  Flexeril   Oxycodone Tylenol  Miralax Blood Thinner  Colace  Oxycodone   Tylenol  Flexeril          You may begin to wean off the pain medication as your pain remains controlled with increased activity.  The schedules provided are meant to serve as an example.  You may wean off based on your pain control.  Please note that pain medications are not filled beyond 6 weeks after surgery.              The times below are an example of how to WEAN OFF medications WHILE CONTINUING TO OPTIMIZE PAIN CONTROL.  Your actual medication schedule may vary based on your last dose taken.      Time 12:00am 4:00am 8:00am 12:00pm 4:00pm 8:00pm   Med Tylenol Oxycodone   Tylenol Oxycodone Tylenol Oxycodone     Time 12:00am 6:00am 12:00pm 6:00pm   Med Tylenol Oxycodone    Tylenol Oxycodone     Time 12:00am 8:00am 4:00pm   Med Tylenol Oxycodone   Tylenol     Time 12:00am 12:00pm   Med Tylenol Tylenol

## 2025-03-10 NOTE — BRIEF OP NOTE
Date: 3/10/2025  OR Location: [facility]    Name: Justina Vela, : 2005, Age: 19 y.o., MRN: [id number], Sex: female    Diagnosis  Pre-op Diagnosis      * Congenital hip dysplasia (HHS-HCC) [Q65.89]     * Tear of acetabular labrum, left, initial encounter [S73.192A]     * Femoroacetabular impingement of left hip [M25.852]     * Hemarthrosis of left hip [M25.052] Post-op Diagnosis     * Congenital hip dysplasia (HHS-HCC) [Q65.89]     * Tear of acetabular labrum, left, initial encounter [S73.192A]     * Femoroacetabular impingement of left hip [M25.852]     * Hemarthrosis of left hip [M25.052]     Procedures    * OSTEOTOMY, PELVIS    * Reconstruction Acetabulum (Eric instruments, eric screws,, totaljoint saw, stilettosteotomies, scope instruments, guardian)  WV OSTEOTOMY, PERIACETABULAR, WITH INTERNAL FIXATION []  [place] ARTHROSCOPY HIP W/FEMOROPLASTY [37962]  WV ARTHROSCOPY HIP W/LABRAL REPAIR [23055]  [place] UNLISTED PROCEDURE ARTHROSCOPY [01965]  Surgeons   Panel 1:     * Kwadwo Brooks - Primary  Panel 2:     * Silas Hansen - Primary    Resident/Fellow/Other Assistant:  Surgeons and Role:  Panel 2:     * Octavia Copeland PA-C - HARJE[facility] First Assist    Staff:   Kennyulator: Kenzie Nicole Person: Beverley Nicole Person: Sarabjit    Anesthesia Staff: Anesthesiologist: uYe Gotti MD MPH; Philip Plunkett DO  CRNA: NGHIA Pizano-CRNA    Procedure Summary  Anesthesia: Anesthesia type not filed in the log.  ASA: I  Estimated Blood Loss: 5mL  Intra-op Medications:   Administrations occurring from 1100 to 1515 on 03/10/25:   Medication Name Total Dose   dexAMETHasone (Decadron) injection 4 mg/mL 4 mg   fentaNYL (Sublimaze) injection 50 mcg/mL 200 mcg   LR bolus Cannot be calculated   lidocaine PF (Xylocaine-MPF) local injection 1 % 5 mL   propofol (Diprivan) injection 10 mg/mL 1,162 mg   rocuronium (ZeMuron) 50 mg/5 mL injection 120 mg   white petrolatum ophthalmic ointment 1 Application   ceFAZolin (Ancef) 2 g in  dextrose (iso)  mL 2 g              Anesthesia Record               Intraprocedure I/O Totals          Intake    ceFAZolin (Ancef) 2 g in dextrose (iso)  mL 100.00 mL    Total Intake 100 mL          Specimen: No specimens collected               Findings: anterosuperior labral tear    Complications:  None; patient tolerated the procedure well.     Disposition: PACU - hemodynamically stable.  Condition: stable  Specimens Collected: No specimens collected  Attending Attestation: I was present and scrubbed for the entire procedure.    Kwadwo Brooks  Phone Number: [phone]

## 2025-03-10 NOTE — ANESTHESIA PROCEDURE NOTES
Peripheral Block    Patient location during procedure: pre-op  Start time: 3/10/2025 10:59 AM  End time: 3/10/2025 11:00 AM  Reason for block: at surgeon's request and post-op pain management  Staffing  Performed: attending   Authorized by: Ed Phillip DO    Performed by: Ed Phillip DO  Preanesthetic Checklist  Completed: patient identified, IV checked, site marked, risks and benefits discussed, surgical consent, monitors and equipment checked, pre-op evaluation and timeout performed   Timeout performed at: 3/10/2025 10:54 AM  Peripheral Block  Patient position: laying flat  Prep: ChloraPrep  Patient monitoring: heart rate, cardiac monitor and continuous pulse ox  Block type: PENG  Laterality: left  Injection technique: single-shot  Guidance: ultrasound guided  Local infiltration: ropivacaine  Infiltration strength: 0.5 %  Dose: 20 mL  Needle  Needle gauge: 22 G  Needle length: 10 cm  Needle localization: ultrasound guidance     image stored in chart  Assessment  Injection assessment: negative aspiration for heme, no paresthesia on injection and incremental injection  Paresthesia pain: none  Heart rate change: no  Slow fractionated injection: yes  Additional Notes  With 4mg Decadron

## 2025-03-10 NOTE — CARE PLAN
POD0 s/p left hip arthroscopy and ROLF with Braulio Hansen and Cecilia.     Activity: PT/OT  Antibiotics: nathalia-op ancef  Diet: advance as tolerated  Dressing: change prior to DC  DVT Ppx: aspirin 81mg BID   Elizalde: remove when ambulatory.   Pain: multimodal  Weight Bearing: TTWB LLE  Dispo: pending clinical course, PT/OT  Follow Up: in two weeks from DC with HARJEET.         Denis Adams MD  Sports Medicine Fellow  Orthopaedic Surgery

## 2025-03-10 NOTE — CARE PLAN
The patient's goals for the shift include  pain management.    The clinical goals for the shift include  pain management.

## 2025-03-10 NOTE — ANESTHESIA POSTPROCEDURE EVALUATION
Patient: Justina Vela    Procedure Summary       Date: 03/10/25 Room / Location: [facility] / [facility]    Anesthesia Start: 1212 Anesthesia Stop: 1615    Procedures:       OSTEOTOMY, PELVIS (Left: Pelvis)      Reconstruction Acetabulum (Left: Hip) Diagnosis:       Congenital hip dysplasia (HHS-HCC)      Tear of acetabular labrum, left, initial encounter      Femoroacetabular impingement of left hip      Hemarthrosis of left hip      (Congenital hip dysplasia (HHS-HCC) [Q65.89])      (Tear of acetabular labrum, left, initial encounter [S73.192A])      (Femoroacetabular impingement of left hip [M25.852])      (Hemarthrosis of left hip [M25.052])    Surgeons: Kwadwo Brooks MD; Silas Hansen MD Responsible Provider: Yue Gotti MD MPH    Anesthesia Type: general ASA Status: 1            Anesthesia Type: general    Vitals Value Taken Time   /75 03/10/25 1610   Temp 36.5 °C (97.7 °F) 03/10/25 1610   Pulse 74 03/10/25 1610   Resp 20 03/10/25 1610   SpO2 100 % 03/10/25 1610       Anesthesia Post Evaluation    Patient location during evaluation: PACU  Patient participation: complete - patient participated  Level of consciousness: awake and alert  Pain score: 6  Pain management: adequate  Multimodal analgesia pain management approach  Airway patency: patent  Two or more strategies used to mitigate risk of obstructive sleep apnea  Cardiovascular status: acceptable  Respiratory status: acceptable  Hydration status: acceptable  Postoperative Nausea and Vomiting: none    No notable events documented.

## 2025-03-10 NOTE — OP NOTE
Periacetabular Osteotomy (L) Operative Note     Date: 3/10/2025  OR Location: [facility]    Name: Justina Vela : 2005, Age: 19 y.o., MRN: [id number], Sex: female    Diagnosis  Left hip Dysplasia Left hip Dysplasia     Procedures  Left Periacetabular Osteotomy, which was of increased complexity and operative time due to the fact that it was following hip arthroscopy causing an injection of arthroscopic fluid and an altered surgical plane.    Surgeons   Kwadwo Brooks MD    Resident/Fellow/Other Assistant:  Surgeons and Role:  Panel 2:     * Saul Merrill MD    Procedure Summary  Anesthesia: General  ASA: I  Anesthesia Staff:   Anesthesiologist: Yue Gotti MD MPH; Philip Plunkett DO  CRNA: NGHIA Pizano-CRNA  Estimated Blood Loss: please see anesthesia record   Intra-op Medications:   Medication Name Total Dose   BUPivacaine HCl (Marcaine) 0.5 % (5 mg/mL) injection 10 mL   lactated Ringer's infusion Cannot be calculated   ceFAZolin in dextrose (iso-os) (Ancef) IVPB 2 g 2 g              Anesthesia Record               Intraprocedure I/O Totals       None           Specimen: No specimens collected     Staff:   Circulator: Kenzie Deshpande RN  Relief Circulator: Macrina Short RN; MARIO Shay; Demi Peña RN  Relief Scrub: Essie Tran SA; Yue Marie PA-C; Myrna Moss  Scrub Person: Sarabjit Garcia; Beverley Sanchez         Drains and/or Catheters:   Urethral Catheter Non-latex 16 Fr. (Active)       Tourniquet Times:         Implants:     Findings: please see below    Indications: Justina Vela is an 19 y.o. female who is having surgery for Congenital hip dysplasia (HHS-HCC) [Q65.89]  Tear of acetabular labrum, left, initial encounter [S73.192A]  Femoroacetabular impingement of left hip [M25.852]  Hemarthrosis of left hip [M25.052].     The patient was seen in the preoperative area. The risks, benefits, complications, treatment options, non-operative alternatives, expected recovery  and outcomes were discussed with the patient. The possibilities of reaction to medication, pulmonary aspiration, injury to surrounding structures, bleeding, recurrent infection, the need for additional procedures, failure to diagnose a condition, and creating a complication requiring transfusion or operation were discussed with the patient. The patient concurred with the proposed plan, giving informed consent.  The site of surgery was properly noted/marked if necessary per policy. The patient has been actively warmed in preoperative area. Preoperative antibiotics have been ordered and given within 1 hours of incision. Venous thrombosis prophylaxis are not indicated.    Procedure Details: The patient was transferred to the operating room positioned supine on the [employer] pivot guardian table.  All bony prominences were padded.  Preoperative antibiotics were administered.  Preoperative timeout was performed.  The correct operative site was identified.  The operative site was prepped and draped in the usual sterile fashion ChloraPrep.  Dr. Hansen first performed a hip arthroscopy on the left hip.  When he was completed I was called in the room upon his procedure closure.  We then opened a bikini line incision with a 10 blade scalpel in line with the inguinal fold.  Subcutaneous dissection was performed with Bovie.  Hemostasis was obtained.  External oblique aponeurosis was then elevated off the iliac crest and an inferior pocket over the tensor fascia meche muscle was created with lecture cautery.  We then opened the Heuter interval incising the fascia over the tensor fascia meche muscle.  Deep dissection was then carried down to sweep the muscle away from the overlying fascia entering the Valdovinos-Sidhu interval.  We then used a small osteotome to create a wafer osteotomy of the anterior superior iliac spine and released periosteal tissue along the superior pubic ramus.  We dissected subperiosteally with electrocautery  and a Brownlee elevator and so we were medial to the iliopectineal eminence.  We then placed a sharp bent Hohmann into the superior ramus for retraction and confirmed we were with in the  foramen beneath the ramus to ensure extra-articular position or ramus osteotomy.  Using smaller Brownlee and a tonsil to dissect subperiosteally the inferior tissue away from the pubic ramus.  Then used an osteotome to perform an oblique osteotomy of the pubic ramus.  Once the osteotomy was complete we then brought in fluoroscopic imaging to confirm preoperative AP, obturator oblique, iliac oblique, and false profile x-rays of the pelvis.  This was managed with a preoperatively.  Pelvis and terms of pelvic tilt.  Once this was confirmed we used curved Castro scissors to open the interval between the iliopsoas tendon and the medial femoral neck.  Once this was developed a small offset osteotome was then used to create a medial central and lateral partial-thickness cuts of the inferior ischium just inferior to the cotyloid fossa.  Once the ischium osteotomy was complete we then turned our attention to the iliac osteotomy using a radiopaque guidewire to first era and score the predicted path of the osteotomy and then using a tip oscillating saw blade to perform the iliac osteotomy.  This was stopped  about a centimeter to a centimeter half short of the pelvic brim to ensure an intact posterior column., superior, lateral corner was cut and completed  The iliac cut and the ischial cuts were then connected along the posterior column using a small followed by a large offset osteotome and finally the posterior, superior, and lateral corner was released with the large osteotome completing the osteotomy.  The fragment was mobilized and a 5 mm Schanz pin was placed after predrilling with a 3.2 mm drill bit in the supra-acetabular corridor.  The fragment was then further mobilized and a cannulated large acetabular fragment clamp was then used  to make her correction.  Appropriate lateral and posterior coverage corrections were made.  Terminally threaded guidewires were then used to temporarily hold the fragment and the same for x-ray views were then taken to compare to the preoperative baseline views.  Once I was satisfied with the correction the wires were then exchanged for four 4.5 mm solid pelvic screws.  The Schanz pin was removed prior to placing the screws.  We then took final x-rays in all 4 standard views.  The wound was then copiously irrigated with Irrisept.  A 2 mm drill hole was then placed in the bed of the anterior superior iliac spine osteotomy.  An all suture repair was then performed of the wafer osteotomy.  The external oblique aponeurosis and tensor fascia meche fascia was then closed with a running #1 Vicryl suture followed by 2-0 Vicryl and Monocryl with a layer of skin followed by a skin glue.  Sterile dressings were applied.     Postoperative plan:  30 pounds foot flat weightbearing left lower extremity.  She will be admitted to the hospital for pain control and physical therapy.  DVT prophylaxis with aspirin.  Medical comanagement.  Calcium and vitamin D protocol for bone health.  Multimodal analgesia.  I will see her back in the office in 2 weeks time for a wound check and with supine AP pelvis with Judet pelvic x-rays.    Complications:  None; patient tolerated the procedure well.    Disposition: PACU - hemodynamically stable.  Condition: stable         Additional Details: none    Attending Attestation: I was present and scrubbed for the key portions of the procedure.    Kwadwo Brooks  Phone Number: [phone]

## 2025-03-10 NOTE — CONSULTS
Consults    Reason For Consult  -Postoperative medical management of anxiety.    History Of Present Illness  Justina Vela is a 19 y.o. female with past medical history of anxiety, dysphagia, migraine headaches, congenital left hip dysplasia with tear of left acetabular labrum, femoral acetabular impingement of left hip, hemarthrosis of left hip, admitted to the hospital and underwent left hip periacetabular osteotomy.  Patient denies recent fever, chest pain, shortness of breath, nausea, vomiting, diarrhea, dysuria, lower extremity edema.  Patient denies chest pain or shortness of breath since having the procedure today.  Otherwise, 10 point review of systems is benign.         Past Medical History  She has no past medical history of Anemia, Asthma, Awareness under anesthesia, Cerebral vascular accident (Multi), CHF (congestive heart failure), Chronic kidney disease, COPD (chronic obstructive pulmonary disease) (Multi), Coronary artery disease, Delayed emergence from general anesthesia, Disease of thyroid gland, GERD (gastroesophageal reflux disease), History of blood transfusion, HL (hearing loss), Hyperlipidemia, Hypertension, Malignant hyperthermia, Myocardial infarction (Multi), PONV (postoperative nausea and vomiting), Seizure disorder (Multi), Sleep apnea, Stroke (Multi), TIA (transient ischemic attack), Type 2 diabetes mellitus, or Vision loss.    Surgical History  She has a past surgical history that includes Adenoidectomy (09/23/2015); Upper gastrointestinal endoscopy; and [place] tooth extraction.     Social History  She reports that she has never smoked. She has never been exposed to tobacco smoke. She has never used smokeless tobacco. She reports current alcohol use. She reports that she does not use drugs.    Family History  Family History   Problem Relation Name Age of Onset    Thyroid cancer Maternal Grandmother      Heart disease Maternal Grandfather      Transient ischemic attack Maternal Grandfather       Other (great aunt with ovarian cancer) Other          Allergies  Patient has no known allergies.    Review of Systems  -As stated in the HPI.  Otherwise, 10 point review of systems is benign.     Physical Exam  General: Alert.  No acute distress.  HEENT: Sclera intact.  CVS: RRR.   Lungs: Anteriorly auscultated and CTAB.   Abdomen: Soft.  Nontender.  Bowel sounds present.  Extremities: No pitting edema right ankle.  Left hip with postop dressings in place.  Psychiatric: Cooperative.       Last Recorded Vitals  BP (!) 111/91   Pulse 86   Temp 36.5 °C (97.7 °F)   Resp 16   Wt 65 kg (143 lb 4.8 oz)   SpO2 100%     Relevant Results  Results for orders placed or performed during the hospital encounter of 03/10/25 (from the past 24 hours)   hCG, Urine, Qualitative   Result Value Ref Range    HCG, Urine NEGATIVE NEGATIVE         Assessment/Plan   Justina Vela is a 19 y.o. female with past medical history of anxiety, dysphagia, migraine headaches, congenital left hip dysplasia with tear of left acetabular labrum, femoral acetabular impingement of left hip, hemarthrosis of left hip, admitted to the hospital and underwent left hip periacetabular osteotomy.    Left hip dysplasia  -Status post left hip periacetabular osteotomy on 3/10/2025.  -Orthopedic surgery following.  -Physical therapy consulted.    Anxiety  -Patient is not on any medications at home.  Monitor.    Dysphagia as listed on the chart  -On speaking with the patient, she denies any difficulty with swallowing.  Monitor.    DVT prophylaxis  -SCDs and chemical DVT prophylaxis per orthopedic surgery which is aspirin 81 mg twice daily.    Blaine Gongora, DO

## 2025-03-11 PROBLEM — D62 ACUTE POSTOPERATIVE ANEMIA DUE TO EXPECTED BLOOD LOSS: Status: ACTIVE | Noted: 2025-03-11

## 2025-03-11 LAB
ANION GAP SERPL CALC-SCNC: 9 MMOL/L (ref 10–20)
BUN SERPL-MCNC: 8 MG/DL (ref 6–23)
CALCIUM SERPL-MCNC: 8.4 MG/DL (ref 8.6–10.3)
CHLORIDE SERPL-SCNC: 105 MMOL/L (ref 98–107)
CO2 SERPL-SCNC: 27 MMOL/L (ref 21–32)
CREAT SERPL-MCNC: 0.77 MG/DL (ref 0.5–1.05)
EGFRCR SERPLBLD CKD-EPI 2021: >90 ML/MIN/1.73M*2
ERYTHROCYTE [DISTWIDTH] IN BLOOD BY AUTOMATED COUNT: 12.4 % (ref 11.5–14.5)
GLUCOSE SERPL-MCNC: 113 MG/DL (ref 74–99)
HCT VFR BLD AUTO: 29.8 % (ref 36–46)
HGB BLD-MCNC: 10.1 G/DL (ref 12–16)
MCH RBC QN AUTO: 30.9 PG (ref 26–34)
MCHC RBC AUTO-ENTMCNC: 33.9 G/DL (ref 32–36)
MCV RBC AUTO: 91 FL (ref 80–100)
NRBC BLD-RTO: ABNORMAL /100{WBCS}
PLATELET # BLD AUTO: 152 X10*3/UL (ref 150–450)
POTASSIUM SERPL-SCNC: 4.3 MMOL/L (ref 3.5–5.3)
RBC # BLD AUTO: 3.27 X10*6/UL (ref 4–5.2)
SODIUM SERPL-SCNC: 137 MMOL/L (ref 136–145)
WBC # BLD AUTO: 12.3 X10*3/UL (ref 4.4–11.3)

## 2025-03-11 PROCEDURE — 1100000001 HC PRIVATE ROOM DAILY

## 2025-03-11 PROCEDURE — 85027 COMPLETE CBC AUTOMATED: CPT | Performed by: PHYSICIAN ASSISTANT

## 2025-03-11 PROCEDURE — 80048 BASIC METABOLIC PNL TOTAL CA: CPT | Performed by: PHYSICIAN ASSISTANT

## 2025-03-11 PROCEDURE — 2500000004 HC RX 250 GENERAL PHARMACY W/ HCPCS (ALT 636 FOR OP/ED): Performed by: PHYSICIAN ASSISTANT

## 2025-03-11 PROCEDURE — 2500000004 HC RX 250 GENERAL PHARMACY W/ HCPCS (ALT 636 FOR OP/ED)

## 2025-03-11 PROCEDURE — 97161 PT EVAL LOW COMPLEX 20 MIN: CPT | Mod: GP

## 2025-03-11 PROCEDURE — 2500000002 HC RX 250 W HCPCS SELF ADMINISTERED DRUGS (ALT 637 FOR MEDICARE OP, ALT 636 FOR OP/ED): Performed by: PHYSICIAN ASSISTANT

## 2025-03-11 PROCEDURE — 2500000001 HC RX 250 WO HCPCS SELF ADMINISTERED DRUGS (ALT 637 FOR MEDICARE OP): Performed by: PHYSICIAN ASSISTANT

## 2025-03-11 PROCEDURE — 97530 THERAPEUTIC ACTIVITIES: CPT | Mod: GP

## 2025-03-11 PROCEDURE — 99232 SBSQ HOSP IP/OBS MODERATE 35: CPT | Performed by: INTERNAL MEDICINE

## 2025-03-11 PROCEDURE — 36415 COLL VENOUS BLD VENIPUNCTURE: CPT | Performed by: PHYSICIAN ASSISTANT

## 2025-03-11 PROCEDURE — 97116 GAIT TRAINING THERAPY: CPT | Mod: GP

## 2025-03-11 RX ADMIN — CEFAZOLIN SODIUM 2 G: 2 INJECTION, SOLUTION INTRAVENOUS at 08:46

## 2025-03-11 RX ADMIN — PANTOPRAZOLE SODIUM 40 MG: 40 TABLET, DELAYED RELEASE ORAL at 06:12

## 2025-03-11 RX ADMIN — OXYCODONE 5 MG: 5 TABLET ORAL at 15:18

## 2025-03-11 RX ADMIN — ACETAMINOPHEN 1000 MG: 500 TABLET, FILM COATED ORAL at 15:18

## 2025-03-11 RX ADMIN — ORPHENADRINE CITRATE 100 MG: 100 TABLET, EXTENDED RELEASE ORAL at 21:13

## 2025-03-11 RX ADMIN — ORPHENADRINE CITRATE 100 MG: 100 TABLET, EXTENDED RELEASE ORAL at 08:46

## 2025-03-11 RX ADMIN — MORPHINE SULFATE 2 MG: 2 INJECTION, SOLUTION INTRAMUSCULAR; INTRAVENOUS at 05:49

## 2025-03-11 RX ADMIN — POLYETHYLENE GLYCOL 3350 17 G: 17 POWDER, FOR SOLUTION ORAL at 21:13

## 2025-03-11 RX ADMIN — MORPHINE SULFATE 2 MG: 2 INJECTION, SOLUTION INTRAMUSCULAR; INTRAVENOUS at 00:16

## 2025-03-11 RX ADMIN — MORPHINE SULFATE 2 MG: 2 INJECTION, SOLUTION INTRAMUSCULAR; INTRAVENOUS at 17:50

## 2025-03-11 RX ADMIN — SENNOSIDES 17.2 MG: 8.6 TABLET, FILM COATED ORAL at 08:46

## 2025-03-11 RX ADMIN — ASPIRIN 81 MG: 81 TABLET, COATED ORAL at 21:13

## 2025-03-11 RX ADMIN — CHOLECALCIFEROL CAP 1.25 MG (50000 UNIT) 50000 UNITS: 1.25 CAP at 08:45

## 2025-03-11 RX ADMIN — SENNOSIDES 17.2 MG: 8.6 TABLET, FILM COATED ORAL at 21:13

## 2025-03-11 RX ADMIN — GABAPENTIN 300 MG: 300 CAPSULE ORAL at 08:46

## 2025-03-11 RX ADMIN — CELECOXIB 200 MG: 200 CAPSULE ORAL at 08:46

## 2025-03-11 RX ADMIN — CEFAZOLIN SODIUM 2 G: 2 INJECTION, SOLUTION INTRAVENOUS at 00:17

## 2025-03-11 RX ADMIN — ACETAMINOPHEN 1000 MG: 500 TABLET, FILM COATED ORAL at 21:32

## 2025-03-11 RX ADMIN — ASPIRIN 81 MG: 81 TABLET, COATED ORAL at 08:45

## 2025-03-11 RX ADMIN — GABAPENTIN 300 MG: 300 CAPSULE ORAL at 21:13

## 2025-03-11 RX ADMIN — ACETAMINOPHEN 1000 MG: 500 TABLET, FILM COATED ORAL at 03:11

## 2025-03-11 RX ADMIN — POLYETHYLENE GLYCOL 3350 17 G: 17 POWDER, FOR SOLUTION ORAL at 08:46

## 2025-03-11 RX ADMIN — GABAPENTIN 300 MG: 300 CAPSULE ORAL at 15:18

## 2025-03-11 RX ADMIN — ACETAMINOPHEN 1000 MG: 500 TABLET, FILM COATED ORAL at 08:45

## 2025-03-11 RX ADMIN — OXYCODONE 5 MG: 5 TABLET ORAL at 03:11

## 2025-03-11 RX ADMIN — OXYCODONE 5 MG: 5 TABLET ORAL at 08:46

## 2025-03-11 RX ADMIN — OXYCODONE 5 MG: 5 TABLET ORAL at 21:13

## 2025-03-11 RX ADMIN — MORPHINE SULFATE 2 MG: 2 INJECTION, SOLUTION INTRAMUSCULAR; INTRAVENOUS at 12:11

## 2025-03-11 ASSESSMENT — COGNITIVE AND FUNCTIONAL STATUS - GENERAL
HELP NEEDED FOR BATHING: A LITTLE
WALKING IN HOSPITAL ROOM: A LITTLE
TURNING FROM BACK TO SIDE WHILE IN FLAT BAD: A LITTLE
MOVING FROM LYING ON BACK TO SITTING ON SIDE OF FLAT BED WITH BEDRAILS: A LITTLE
DAILY ACTIVITIY SCORE: 21
DAILY ACTIVITIY SCORE: 21
DRESSING REGULAR LOWER BODY CLOTHING: A LITTLE
STANDING UP FROM CHAIR USING ARMS: A LITTLE
STANDING UP FROM CHAIR USING ARMS: A LITTLE
MOVING TO AND FROM BED TO CHAIR: A LITTLE
TOILETING: A LITTLE
MOVING TO AND FROM BED TO CHAIR: A LITTLE
STANDING UP FROM CHAIR USING ARMS: A LITTLE
WALKING IN HOSPITAL ROOM: A LITTLE
CLIMB 3 TO 5 STEPS WITH RAILING: A LITTLE
WALKING IN HOSPITAL ROOM: A LITTLE
TOILETING: A LITTLE
CLIMB 3 TO 5 STEPS WITH RAILING: A LITTLE
HELP NEEDED FOR BATHING: A LITTLE
DRESSING REGULAR LOWER BODY CLOTHING: A LITTLE
CLIMB 3 TO 5 STEPS WITH RAILING: A LITTLE
MOBILITY SCORE: 19
MOBILITY SCORE: 19
TURNING FROM BACK TO SIDE WHILE IN FLAT BAD: A LITTLE
MOBILITY SCORE: 18
TURNING FROM BACK TO SIDE WHILE IN FLAT BAD: A LITTLE
MOVING TO AND FROM BED TO CHAIR: A LITTLE

## 2025-03-11 ASSESSMENT — PAIN - FUNCTIONAL ASSESSMENT
PAIN_FUNCTIONAL_ASSESSMENT: 0-10
PAIN_FUNCTIONAL_ASSESSMENT: UNABLE TO SELF-REPORT
PAIN_FUNCTIONAL_ASSESSMENT: 0-10

## 2025-03-11 ASSESSMENT — PAIN DESCRIPTION - DESCRIPTORS
DESCRIPTORS: DULL;ACHING
DESCRIPTORS: ACHING
DESCRIPTORS: DULL
DESCRIPTORS: ACHING
DESCRIPTORS: DULL
DESCRIPTORS: ACHING
DESCRIPTORS: ACHING
DESCRIPTORS: DULL
DESCRIPTORS: DULL
DESCRIPTORS: ACHING
DESCRIPTORS: DULL;ACHING
DESCRIPTORS: ACHING
DESCRIPTORS: DULL;ACHING
DESCRIPTORS: DULL
DESCRIPTORS: ACHING
DESCRIPTORS: DULL;ACHING
DESCRIPTORS: DULL
DESCRIPTORS: ACHING

## 2025-03-11 ASSESSMENT — PAIN DESCRIPTION - ORIENTATION
ORIENTATION: LEFT
ORIENTATION: LEFT

## 2025-03-11 ASSESSMENT — PAIN SCALES - GENERAL
PAINLEVEL_OUTOF10: 5 - MODERATE PAIN
PAINLEVEL_OUTOF10: 4
PAINLEVEL_OUTOF10: 5 - MODERATE PAIN
PAINLEVEL_OUTOF10: 4
PAINLEVEL_OUTOF10: 3
PAINLEVEL_OUTOF10: 5 - MODERATE PAIN
PAINLEVEL_OUTOF10: 3
PAINLEVEL_OUTOF10: 4
PAINLEVEL_OUTOF10: 3
PAINLEVEL_OUTOF10: 4
PAINLEVEL_OUTOF10: 5 - MODERATE PAIN
PAINLEVEL_OUTOF10: 4
PAINLEVEL_OUTOF10: 6
PAINLEVEL_OUTOF10: 3
PAINLEVEL_OUTOF10: 2
PAINLEVEL_OUTOF10: 3
PAINLEVEL_OUTOF10: 3
PAINLEVEL_OUTOF10: 2
PAINLEVEL_OUTOF10: 4
PAINLEVEL_OUTOF10: 3
PAINLEVEL_OUTOF10: 2
PAINLEVEL_OUTOF10: 6

## 2025-03-11 ASSESSMENT — PAIN DESCRIPTION - LOCATION
LOCATION: HIP
LOCATION: HIP

## 2025-03-11 ASSESSMENT — ACTIVITIES OF DAILY LIVING (ADL): ADL_ASSISTANCE: INDEPENDENT

## 2025-03-11 NOTE — ASSESSMENT & PLAN NOTE
She is asymptomatic.  There is no indication for blood transfusion.  Hemoglobin levels be monitored.

## 2025-03-11 NOTE — NURSING NOTE
Pt. Vital signs obtained.  Pt. Ordered breakfast.  Call light within reach.  Bed alarm on.  Scd's on.

## 2025-03-11 NOTE — PROGRESS NOTES
HOSPITALIST    Patient seen, chart reviewed.  Vital signs stable.  Patient currently sleeping, had received morphine earlier.  No events per nursing staff.

## 2025-03-11 NOTE — PROGRESS NOTES
Physical Therapy Evaluation & Treatment    Patient Name: Justina Vela  MRN: [id number]  Department: [facility]  Room: 202/202A  Today's Date: 3/11/2025   Time Calculation  Start Time: 0855  Stop Time: 0940  Time Calculation (min): 45 min    Assessment/Plan   PT Assessment  PT Assessment Results: Decreased strength, Decreased range of motion, Decreased endurance, Pain, Orthopedic restrictions  Rehab Prognosis: Good  Barriers to Discharge Home: No anticipated barriers  Evaluation/Treatment Tolerance: Patient tolerated treatment well  End of Session Communication: Bedside nurse  Assessment Comment: Pt presents with impaired functional mobility s/p L ROLF. Recommend discharge home with intermittent assistance and outpatient PT once cleared by surgeon.   End of Session Patient Position: Bed, 3 rail up, Alarm on, BLE elevated, ice to surgical site, call light in reach, needs met, RN aware.  IP OR SWING BED PT PLAN  Inpatient or Swing Bed: Inpatient  PT Plan  Treatment/Interventions: Bed mobility, Transfer training, Gait training, Stair training, Strengthening, Endurance training, Range of motion, Therapeutic exercise, Home exercise program, Therapeutic activity, Positioning  PT Plan: Ongoing PT  PT Frequency: 5 times per week  PT Discharge Recommendations: Low intensity level of continued care  Equipment Recommended upon Discharge: Wheeled walker, Crutches  PT Recommended Transfer Status: Stand by assist, Assistive device      Subjective     General Visit Information:  General  Reason for Referral: L ROLF and hip arthroscopy with arthroscopic rim trim; acetabular labral repair; intra-articular loose body removal; femoral osteochondroplasty; and capsular plication.   Referred By: Dr. Brooks and Dr. Hansen  Past Medical History Relevant to Rehab: anxiety, dysphagia, migraines, hip dysplasia  Family/Caregiver Present: No  Prior to Session Communication: Bedside nurse  Patient Position Received: Bed, 3 rail up, Alarm on  General  Comment: L hip post-op dressing dry and intact.     Home Living:  Home Living  Type of Home: House  Lives With: Parent(s), Siblings  Home Layout: Multi-level, Stairs to alternate level with rails, Bed/bath upstairs  Alternate Level Stairs-Number of Steps: 12  Home Access: Stairs to enter with rails  Entrance Stairs-Number of Steps: 5  Prior Level of Function:  Prior Function Per Pt/Caregiver Report  Level of [place]: Independent with ADLs and functional transfers, Independent with homemaking with ambulation  ADL Assistance: Independent  Homemaking Assistance: Independent  Ambulatory Assistance: Independent  Vocational:  (college student)  Prior Function Comments: Pt denies falls prior to admission.  Precautions:  Precautions  LE Weight Bearing Status: Flat-foot Weight Bearing (LLE 30 lbs)  Medical Precautions: Fall precautions  Braces Applied: Upon PT arrival, pt was wearing a hip scope  brace locked in 0 degrees of hip flexion. PT did not see any mention of a brace in any surgeon's notes. PT reached out to Dr. Brooks and Dr. Hasnen and Dr. Brooks stated no brace needed, therefore this PT doffed hip scope brace from this patient and instructed patient in no ROM restrictions, however reinforced LLE 30 lbs FFWB precautions.      Objective   Pain:  Pain Assessment  Pain Assessment: 0-10  0-10 (Numeric) Pain Score: 5 - Moderate pain  Pain Type: Surgical pain  Pain Location: Hip  Pain Orientation: Left  Pain Interventions: Cold applied, Repositioned  Cognition:  Cognition  Overall Cognitive Status: Within Functional Limits  Orientation Level: Oriented X4  Attention: Within Functional Limits  Memory: Within Funtional Limits  Problem Solving: Within Functional Limits  Numeric Reasoning: Within Functional Limits  Abstract Reasoning: Within Functional Limits  Safety/Judgement: Within Functional Limits  Insight: Within function limits    General Assessments:  Activity Tolerance  Endurance: Endurance does not limit  participation in activity    Sensation  Light Touch: No apparent deficits    Coordination  Movements are Fluid and Coordinated: Yes    Postural Control  Postural Control: Within Functional Limits    Static Sitting Balance  Static Sitting-Balance Support: Feet supported  Static Sitting-Level of Assistance: Independent  Dynamic Sitting Balance  Dynamic Sitting-Balance Support: Feet supported  Dynamic Sitting-Level of Assistance: Independent    Static Standing Balance  Static Standing-Balance Support: Bilateral upper extremity supported (with RW)  Static Standing-Level of Assistance: Close supervision  Dynamic Standing Balance  Dynamic Standing-Balance Support: Bilateral upper extremity supported (with RW)  Dynamic Standing-Level of Assistance: Close supervision  Functional Assessments:  Bed Mobility  Bed Mobility: Yes  Bed Mobility 1  Bed Mobility 1: Supine to sitting  Level of Assistance 1: Minimum assistance (assist moving LLE OOB)    Transfers  Transfer: Yes  Transfer 1  Transfer From 1: Bed to, Toilet to  Transfer to 1: Toilet, Chair with arms  Technique 1: Sit to stand, Stand to sit  Transfer Device 1: Walker  Transfer Level of Assistance 1: Close supervision, Minimal verbal cues (cues for WBing status and LLE positioning)    Ambulation/Gait Training  Ambulation/Gait Training Performed: Yes  Ambulation/Gait Training 1  Surface 1: Level tile  Device 1: Rolling walker  Assistance 1: Close supervision, Minimal verbal cues (cues for sequencing and LLE WBing status)  Quality of Gait 1: Decreased step length, Antalgic (decreased nadiya, step to pattern, good adherence to WBing status, no LOB noted.)  Comments/Distance (ft) 1: 50 feet x 2  Extremity/Trunk Assessments:  RUE   RUE : Within Functional Limits  LUE   LUE: Within Functional Limits  RLE   RLE : Within Functional Limits  LLE   LLE : Exceptions to WFL, hip ROM/strength limited due to post-op pain.    Outcome Measures:  [facility] Basic Mobility  Turning from your  back to your side while in a flat bed without using bedrails: A little  Moving from lying on your back to sitting on the side of a flat bed without using bedrails: A little  Moving to and from bed to chair (including a wheelchair): A little  Standing up from a chair using your arms (e.g. wheelchair or bedside chair): A little  To walk in hospital room: A little  Climbing 3-5 steps with railing: A little  Basic Mobility - Total Score: 18    Encounter Problems       Encounter Problems (Active)       Balance       LTG - Patient will demonstrate Intervention to enhance balance for safe completion of daily activities (Progressing)       Start:  03/11/25            LTG - Patient will maintain balance to allow for safe mobility (Progressing)       Start:  03/11/25               Compromised Skin Integrity       LTG - Patient will be free from infection (Progressing)       Start:  03/11/25               Mobility       LTG - Patient will ambulate community distance with crutches at a modified independent level.   (Progressing)       Start:  03/11/25            LTG - Patient will navigate 4-6 steps with crutches at a modified independent level.   (Progressing)       Start:  03/11/25               PT Transfers       LTG - Patient will demonstrate safe transfer techniques with crutches at a modified independent level.   (Progressing)       Start:  03/11/25               Safety       LTG - Patient will demonstrate safety requirements appropriate to situation/environment (Progressing)       Start:  03/11/25                 Martha Irving, PT, DPT

## 2025-03-11 NOTE — PROGRESS NOTES
Justina Vela is a 19 y.o. female on day 1 of admission presenting with Congenital hip dysplasia (HHS-HCC).      Subjective   She reports postoperative pain is adequately controlled.  She is tolerating physical therapy.  She denies dizziness, chest pain or shortness of breath.       Objective     Last Recorded Vitals  /69 (BP Location: Right arm, Patient Position: Sitting)   Pulse 67   Temp 37 °C (98.6 °F) (Temporal)   Resp 16   Wt 65 kg (143 lb 4.8 oz)   SpO2 99%   Intake/Output last 3 Shifts:    Intake/Output Summary (Last 24 hours) at 3/11/2025 1128  Last data filed at 3/11/2025 1053  Gross per 24 hour   Intake 1620 ml   Output 4850 ml   Net -3230 ml       Admission Weight  Weight: 65 kg (143 lb 4.8 oz) (03/10/25 0948)    Daily Weight  03/10/25 : 65 kg (143 lb 4.8 oz)    Image Results  FL fluoro images no charge  These images are not reportable by radiology and will not be interpreted   by  Radiologists.      Physical Exam  Constitutional:       Appearance: Normal appearance.   HENT:      Head: Normocephalic and atraumatic.      Nose: Nose normal.      Mouth/Throat:      Mouth: Mucous membranes are moist.      Pharynx: Oropharynx is clear.   Eyes:      Extraocular Movements: Extraocular movements intact.      Conjunctiva/sclera: Conjunctivae normal.      Pupils: Pupils are equal, round, and reactive to light.   Cardiovascular:      Rate and Rhythm: Normal rate and regular rhythm.      Pulses: Normal pulses.      Heart sounds: Normal heart sounds.   Pulmonary:      Effort: Pulmonary effort is normal.      Breath sounds: Normal breath sounds.   Abdominal:      General: Abdomen is flat. Bowel sounds are normal.      Palpations: Abdomen is soft.      Tenderness: There is no abdominal tenderness.   Musculoskeletal:         General: Normal range of motion.      Cervical back: Normal range of motion and neck supple.   Skin:     General: Skin is warm and dry.   Neurological:      General: No focal deficit  present.      Mental Status: She is alert and oriented to person, place, and time.   Psychiatric:         Mood and Affect: Mood normal.         Behavior: Behavior normal.         Thought Content: Thought content normal.         Relevant Results               Assessment/Plan                  Assessment & Plan  Congenital hip dysplasia (HHS-HCC)  The patient is doing well postoperatively.  Postoperative plan including DVT prophylaxis, pain medications, physical therapy and Occupational Therapy.  Per orthopedic surgery.  Acute postoperative anemia due to expected blood loss  She is asymptomatic.  There is no indication for blood transfusion.  Hemoglobin levels be monitored.  Tear of acetabular labrum, left, initial encounter  Plan as above  Femoroacetabular impingement of left hip  Plan as above  Hemarthrosis of left hip  Plan as above                Dc Dodd MD

## 2025-03-11 NOTE — ASSESSMENT & PLAN NOTE
The patient is doing well postoperatively.  Postoperative plan including DVT prophylaxis, pain medications, physical therapy and Occupational Therapy.  Per orthopedic surgery.

## 2025-03-11 NOTE — CARE PLAN
19 yr old female admitted inpatient following Left ROLF with Dr. Brooks yesterday.  Plan is to work with PT today, remove rush, and continue to control pain.  TCC anticipates home tomorrow with no skilled needs.   Post op follow up within 2-3 weeks.

## 2025-03-11 NOTE — PROGRESS NOTES
"Justina Vela is a 19 y.o. female on day 1 of admission presenting with No Principal Problem: There is no principal problem currently on the Problem List. Please update the Problem List and refresh..    POD1 s/p left hip arthroscopy and ROLF with Braulio Hansen and Cecilia.     Subjective   No acute events overnight. States she is doing well this morning. Denies chest pain and SOB. Denies numbness and tingling in the LLE. Elizalde remains in place.        Objective     Physical Exam  Vitals and nursing note reviewed.   Constitutional:       Appearance: Normal appearance.   Cardiovascular:      Rate and Rhythm: Normal rate and regular rhythm.   Pulmonary:      Effort: Pulmonary effort is normal.   Abdominal:      General: Abdomen is flat.      Palpations: Abdomen is soft.   Neurological:      Mental Status: She is alert.       LLE: dressing c/d/I.   Thigh soft.   Intact ankle DF/PF, EHL/FHL, wiggles toes.   SILT in all distributions.   2+ DP pulse.     Last Recorded Vitals  Blood pressure 116/69, pulse 67, temperature 37 °C (98.6 °F), temperature source Temporal, resp. rate 16, height 1.651 m (5' 5\"), weight 65 kg (143 lb 4.8 oz), last menstrual period 02/28/2025, SpO2 99%.  Intake/Output last 3 Shifts:  I/O last 3 completed shifts:  In: 1500 (23.1 mL/kg) [IV Piggyback:1500]  Out: 3650 (56.2 mL/kg) [Urine:3500 (1.5 mL/kg/hr); Blood:150]  Weight: 65 kg     Relevant Results      Scheduled medications  acetaminophen, 1,000 mg, oral, q6h  aspirin, 81 mg, oral, BID  ceFAZolin, 2 g, intravenous, q8h  celecoxib, 200 mg, oral, Daily  cholecalciferol, 50,000 Units, oral, Daily  gabapentin, 300 mg, oral, TID  losartan, 12.5 mg, oral, Daily  morphine, 2 mg, intravenous, q6h VANCE  orphenadrine, 100 mg, oral, BID  oxyCODONE, 5 mg, oral, q6h  pantoprazole, 40 mg, oral, Daily before breakfast  polyethylene glycol, 17 g, oral, BID  sennosides, 2 tablet, oral, BID      Continuous medications     PRN medications  PRN medications: melatonin, " ondansetron ODT **OR** ondansetron, oxygen  Results for orders placed or performed during the hospital encounter of 03/10/25 (from the past 24 hours)   hCG, Urine, Qualitative   Result Value Ref Range    HCG, Urine NEGATIVE NEGATIVE   CBC   Result Value Ref Range    WBC 12.3 (H) 4.4 - 11.3 x10*3/uL    nRBC      RBC 3.27 (L) 4.00 - 5.20 x10*6/uL    Hemoglobin 10.1 (L) 12.0 - 16.0 g/dL    Hematocrit 29.8 (L) 36.0 - 46.0 %    MCV 91 80 - 100 fL    MCH 30.9 26.0 - 34.0 pg    MCHC 33.9 32.0 - 36.0 g/dL    RDW 12.4 11.5 - 14.5 %    Platelets 152 150 - 450 x10*3/uL   Basic Metabolic Panel   Result Value Ref Range    Glucose 113 (H) 74 - 99 mg/dL    Sodium 137 136 - 145 mmol/L    Potassium 4.3 3.5 - 5.3 mmol/L    Chloride 105 98 - 107 mmol/L    Bicarbonate 27 21 - 32 mmol/L    Anion Gap 9 (L) 10 - 20 mmol/L    Urea Nitrogen 8 6 - 23 mg/dL    Creatinine 0.77 0.50 - 1.05 mg/dL    eGFR >90 >60 mL/min/1.73m*2    Calcium 8.4 (L) 8.6 - 10.3 mg/dL               This patient has a urinary catheter   Reason for the urinary catheter remaining today? Urine catheter unnecessary, will be removed today               Assessment/Plan   Assessment & Plan  Congenital hip dysplasia (HHS-HCC)    Tear of acetabular labrum, left, initial encounter    Femoroacetabular impingement of left hip    Hemarthrosis of left hip    POD1 s/p left hip arthroscopy and ROLF with Braulio Hansen and Cecilia.     Activity: PT/OT  Antibiotics: nathalia-op ancef  Diet: advance as tolerated  Dressing: change prior to DC  DVT Ppx: aspirin 81mg BID   Elizalde: remove when ambulatory.   Pain: multimodal  Weight Bearing: TTWB LLE  Dispo: pending clinical course, PT/OT  Follow Up: in two weeks from DC with HARJEET.         Denis Adams MD  Sports Medicine Fellow  Orthopaedic Surgery          I spent 20 minutes in the professional and overall care of this patient.      Denis Adams MD

## 2025-03-11 NOTE — CARE PLAN
Problem: Balance  Goal: LTG - Patient will demonstrate Intervention to enhance balance for safe completion of daily activities  Outcome: Progressing  Goal: LTG - Patient will maintain balance to allow for safe mobility  Outcome: Progressing     Problem: Mobility  Goal: LTG - Patient will ambulate community distance with crutches at a modified independent level.    Outcome: Progressing  Goal: LTG - Patient will navigate 4-6 steps with crutches at a modified independent level.    Outcome: Progressing     Problem: Safety  Goal: LTG - Patient will demonstrate safety requirements appropriate to situation/environment  Outcome: Progressing     Problem: PT Transfers  Goal: LTG - Patient will demonstrate safe transfer techniques with crutches at a modified independent level.    Outcome: Progressing     Problem: Pain  Goal: LTG - Patient will manage pain with the appropriate technique/Intervention  Outcome: Progressing     Problem: Compromised Skin Integrity  Goal: LTG - Patient will be free from infection  Outcome: Progressing

## 2025-03-11 NOTE — NURSING NOTE
Assumed care of patient. Report received from HEATHER Goodwin. Patient lying in bed resting. Vital signs stable, no acute distress. S/P left hip surgery earlier today. Rates her pain a 3/10 in the left hip. Surgical dressing clean, dry and intact with ice pack in place. Elizalde catheter draining clear yellow urine. No stated needs at this time. Call light is within reach and bed alarm is activated.

## 2025-03-11 NOTE — NURSING NOTE
Patient lying in bed. Vital signs stable. She rates her pain in the left hip a 4/10. Pain has been well controlled. Elizalde catheter draining clear yellow urine. IV ancef running per order. No stated needs at this time. Call light is within reach and bed alarm is activated.

## 2025-03-11 NOTE — NURSING NOTE
Assumed care of pt from nightshift RN. Pt resting in bed with call light in reach. 2+ pulses and cap refill brisk. Lung sounds clear bilaterally. Surgical dressing dry and intact. SCD's and ice pack in place. Bed alarm activated.

## 2025-03-11 NOTE — NURSING NOTE
Patient resting in bed. Morning labs drawn and sent to the lab. Rates her pain a 4/10 and was medicated with scheduled tylenol and oxycodone. Pain has been well controlled. Ambulated in the room with walker and assist per PCA and tolerated well. No stated needs at this time. Call light is within reach and bed alarm is activated.

## 2025-03-11 NOTE — NURSING NOTE
Elizalde catheter removed at this time per orders. 10 mL balloon deflated and tip intact. Removed without incidence. No further orders. Encouraged pt to drink adequate fluids.

## 2025-03-12 VITALS
BODY MASS INDEX: 23.88 KG/M2 | TEMPERATURE: 98.4 F | DIASTOLIC BLOOD PRESSURE: 66 MMHG | HEIGHT: 65 IN | RESPIRATION RATE: 16 BRPM | WEIGHT: 143.3 LBS | HEART RATE: 69 BPM | SYSTOLIC BLOOD PRESSURE: 112 MMHG | OXYGEN SATURATION: 97 %

## 2025-03-12 DIAGNOSIS — Q65.89 HIP DYSPLASIA (HHS-HCC): ICD-10-CM

## 2025-03-12 LAB
ANION GAP SERPL CALC-SCNC: 8 MMOL/L (ref 10–20)
BUN SERPL-MCNC: 9 MG/DL (ref 6–23)
CALCIUM SERPL-MCNC: 8.4 MG/DL (ref 8.6–10.3)
CHLORIDE SERPL-SCNC: 106 MMOL/L (ref 98–107)
CO2 SERPL-SCNC: 28 MMOL/L (ref 21–32)
CREAT SERPL-MCNC: 0.73 MG/DL (ref 0.5–1.05)
EGFRCR SERPLBLD CKD-EPI 2021: >90 ML/MIN/1.73M*2
ERYTHROCYTE [DISTWIDTH] IN BLOOD BY AUTOMATED COUNT: 12.8 % (ref 11.5–14.5)
GLUCOSE SERPL-MCNC: 97 MG/DL (ref 74–99)
HCT VFR BLD AUTO: 28.3 % (ref 36–46)
HGB BLD-MCNC: 9.3 G/DL (ref 12–16)
MCH RBC QN AUTO: 30.7 PG (ref 26–34)
MCHC RBC AUTO-ENTMCNC: 32.9 G/DL (ref 32–36)
MCV RBC AUTO: 93 FL (ref 80–100)
NRBC BLD-RTO: ABNORMAL /100{WBCS}
PLATELET # BLD AUTO: 118 X10*3/UL (ref 150–450)
POTASSIUM SERPL-SCNC: 4.1 MMOL/L (ref 3.5–5.3)
RBC # BLD AUTO: 3.03 X10*6/UL (ref 4–5.2)
SODIUM SERPL-SCNC: 138 MMOL/L (ref 136–145)
WBC # BLD AUTO: 8.3 X10*3/UL (ref 4.4–11.3)

## 2025-03-12 PROCEDURE — 2500000001 HC RX 250 WO HCPCS SELF ADMINISTERED DRUGS (ALT 637 FOR MEDICARE OP): Performed by: PHYSICIAN ASSISTANT

## 2025-03-12 PROCEDURE — 80048 BASIC METABOLIC PNL TOTAL CA: CPT | Performed by: PHYSICIAN ASSISTANT

## 2025-03-12 PROCEDURE — 2500000004 HC RX 250 GENERAL PHARMACY W/ HCPCS (ALT 636 FOR OP/ED): Performed by: PHYSICIAN ASSISTANT

## 2025-03-12 PROCEDURE — 2500000001 HC RX 250 WO HCPCS SELF ADMINISTERED DRUGS (ALT 637 FOR MEDICARE OP): Performed by: INTERNAL MEDICINE

## 2025-03-12 PROCEDURE — 97530 THERAPEUTIC ACTIVITIES: CPT | Mod: GP

## 2025-03-12 PROCEDURE — 97116 GAIT TRAINING THERAPY: CPT | Mod: GP

## 2025-03-12 PROCEDURE — 99232 SBSQ HOSP IP/OBS MODERATE 35: CPT | Performed by: INTERNAL MEDICINE

## 2025-03-12 PROCEDURE — 2500000004 HC RX 250 GENERAL PHARMACY W/ HCPCS (ALT 636 FOR OP/ED)

## 2025-03-12 PROCEDURE — 36415 COLL VENOUS BLD VENIPUNCTURE: CPT | Performed by: PHYSICIAN ASSISTANT

## 2025-03-12 PROCEDURE — 85027 COMPLETE CBC AUTOMATED: CPT | Performed by: PHYSICIAN ASSISTANT

## 2025-03-12 PROCEDURE — 2500000002 HC RX 250 W HCPCS SELF ADMINISTERED DRUGS (ALT 637 FOR MEDICARE OP, ALT 636 FOR OP/ED): Performed by: PHYSICIAN ASSISTANT

## 2025-03-12 RX ORDER — FLUCONAZOLE 100 MG/1
150 TABLET ORAL ONCE
Status: COMPLETED | OUTPATIENT
Start: 2025-03-12 | End: 2025-03-12

## 2025-03-12 RX ADMIN — GABAPENTIN 300 MG: 300 CAPSULE ORAL at 09:08

## 2025-03-12 RX ADMIN — ACETAMINOPHEN 1000 MG: 500 TABLET, FILM COATED ORAL at 03:13

## 2025-03-12 RX ADMIN — MORPHINE SULFATE 2 MG: 2 INJECTION, SOLUTION INTRAMUSCULAR; INTRAVENOUS at 00:04

## 2025-03-12 RX ADMIN — OXYCODONE 5 MG: 5 TABLET ORAL at 09:08

## 2025-03-12 RX ADMIN — LOSARTAN POTASSIUM 12.5 MG: 25 TABLET, FILM COATED ORAL at 09:08

## 2025-03-12 RX ADMIN — ORPHENADRINE CITRATE 100 MG: 100 TABLET, EXTENDED RELEASE ORAL at 09:08

## 2025-03-12 RX ADMIN — ACETAMINOPHEN 1000 MG: 500 TABLET, FILM COATED ORAL at 09:08

## 2025-03-12 RX ADMIN — PANTOPRAZOLE SODIUM 40 MG: 40 TABLET, DELAYED RELEASE ORAL at 06:10

## 2025-03-12 RX ADMIN — SENNOSIDES 17.2 MG: 8.6 TABLET, FILM COATED ORAL at 09:08

## 2025-03-12 RX ADMIN — CHOLECALCIFEROL CAP 1.25 MG (50000 UNIT) 50000 UNITS: 1.25 CAP at 09:08

## 2025-03-12 RX ADMIN — FLUCONAZOLE 150 MG: 100 TABLET ORAL at 10:22

## 2025-03-12 RX ADMIN — OXYCODONE 5 MG: 5 TABLET ORAL at 03:07

## 2025-03-12 RX ADMIN — ASPIRIN 81 MG: 81 TABLET, COATED ORAL at 09:08

## 2025-03-12 RX ADMIN — POLYETHYLENE GLYCOL 3350 17 G: 17 POWDER, FOR SOLUTION ORAL at 09:08

## 2025-03-12 RX ADMIN — CELECOXIB 200 MG: 200 CAPSULE ORAL at 09:08

## 2025-03-12 ASSESSMENT — PAIN SCALES - GENERAL
PAINLEVEL_OUTOF10: 2
PAINLEVEL_OUTOF10: 5 - MODERATE PAIN
PAINLEVEL_OUTOF10: 3
PAINLEVEL_OUTOF10: 5 - MODERATE PAIN
PAINLEVEL_OUTOF10: 4
PAINLEVEL_OUTOF10: 5 - MODERATE PAIN
PAINLEVEL_OUTOF10: 4
PAINLEVEL_OUTOF10: 3

## 2025-03-12 ASSESSMENT — PAIN - FUNCTIONAL ASSESSMENT
PAIN_FUNCTIONAL_ASSESSMENT: 0-10

## 2025-03-12 ASSESSMENT — COGNITIVE AND FUNCTIONAL STATUS - GENERAL
MOVING FROM LYING ON BACK TO SITTING ON SIDE OF FLAT BED WITH BEDRAILS: A LITTLE
TURNING FROM BACK TO SIDE WHILE IN FLAT BAD: A LITTLE
WALKING IN HOSPITAL ROOM: A LITTLE
TOILETING: A LITTLE
MOBILITY SCORE: 19
DAILY ACTIVITIY SCORE: 21
HELP NEEDED FOR BATHING: A LITTLE
MOBILITY SCORE: 22
MOVING TO AND FROM BED TO CHAIR: A LITTLE
STANDING UP FROM CHAIR USING ARMS: A LITTLE
DRESSING REGULAR LOWER BODY CLOTHING: A LITTLE
TURNING FROM BACK TO SIDE WHILE IN FLAT BAD: A LITTLE
CLIMB 3 TO 5 STEPS WITH RAILING: A LITTLE

## 2025-03-12 ASSESSMENT — PAIN DESCRIPTION - LOCATION
LOCATION: HIP
LOCATION: HIP

## 2025-03-12 ASSESSMENT — PAIN DESCRIPTION - DESCRIPTORS
DESCRIPTORS: DULL
DESCRIPTORS: ACHING
DESCRIPTORS: DULL

## 2025-03-12 ASSESSMENT — PAIN DESCRIPTION - ORIENTATION
ORIENTATION: LEFT
ORIENTATION: LEFT

## 2025-03-12 NOTE — PROGRESS NOTES
Physical Therapy Treatment    Patient Name: Justina Vela  MRN: [id number]  Department: [facility]  Room: 202202  Today's Date: 3/12/2025  Time Calculation  Start Time: 0925  Stop Time: 0949  Time Calculation (min): 24 min         Assessment/Plan   PT Assessment  PT Assessment Results: Decreased strength, Decreased range of motion, Pain, Orthopedic restrictions  Rehab Prognosis: Excellent  Barriers to Discharge Home: No anticipated barriers  Evaluation/Treatment Tolerance: Patient tolerated treatment well  End of Session Communication: Bedside nurse  Assessment Comment: Pt ambulated greater than community distance with crutches and able to negotiate stairs in prep for home going. Pt will have assist from her parents and her sisters. Pt to follow up with Dr. Brooks prior to initiating outpatient PT.  End of Session Patient Position: Bed, 3 rail up  PT Plan  Inpatient/Swing Bed or Outpatient: Inpatient  PT Plan  Treatment/Interventions: Bed mobility, Transfer training, Gait training, Stair training, Strengthening, Endurance training, Range of motion, Therapeutic exercise, Therapeutic activity, Positioning  PT Plan: Ongoing PT  PT Frequency: 5 times per week  PT Discharge Recommendations: Low intensity level of continued care  Equipment Recommended upon Discharge: Crutches  PT Recommended Transfer Status: Stand by assist, Assistive device  PT - OK to Discharge: Yes      General Visit Information:   PT  Visit  PT Received On: 03/12/25  Response to Previous Treatment: Patient with no complaints from previous session.  General  Family/Caregiver Present: No  Prior to Session Communication: Bedside nurse  Patient Position Received: Bed, 3 rail up, Alarm on    Subjective   Precautions:  Precautions  LE Weight Bearing Status: Flat-foot Weight Bearing (30 lbs LLE)  Medical Precautions: Fall precautions            Objective   Pain:  Pain Assessment  Pain Assessment: 0-10  0-10 (Numeric) Pain Score: 3  Pain Type: Surgical pain  Pain  Location: Hip  Pain Orientation: Left  Pain Interventions: Repositioned, Cold applied    Activity Tolerance:  Activity Tolerance  Endurance: Endurance does not limit participation in activity  Treatments:  Bed Mobility  Bed Mobility: Yes  Bed Mobility 1  Bed Mobility 1: Supine to sitting, Sitting to supine  Level of Assistance 1: Minimum assistance (assist moving LLE in and out of Bed)    Ambulation/Gait Training  Ambulation/Gait Training Performed: Yes  Ambulation/Gait Training 1  Surface 1: Level tile  Device 1: Axillary crutches  Assistance 1: Close supervision, Minimal verbal cues (cues for sequencing)  Quality of Gait 1: Decreased step length (decreased nadiya, step to pattern, good adherence to WBing status, no LOB noted)  Comments/Distance (ft) 1: 200 feet x 2    Transfers  Transfer: Yes  Transfer 1  Transfer From 1: Bed to, Chair with arms to  Transfer to 1: Bed, Chair with arms  Technique 1: Sit to stand, Stand to sit  Transfer Device 1: Crutches  Transfer Level of Assistance 1: Distant supervision, Minimal verbal cues (cues for hand placement)    Stairs  Stairs: Yes  Stairs  Rails 1: Bilateral  Assistance 1: Close supervision, Minimal verbal cues (cues for sequencing)  Comment/Number of Steps 1: 3  Pt demonstrated step to pattern, decreased nadiya, no LOB noted, good adherence to LLE WBing status.     Outcome Measures:  [facility] Basic Mobility  Turning from your back to your side while in a flat bed without using bedrails: A little  Moving from lying on your back to sitting on the side of a flat bed without using bedrails: A little  Moving to and from bed to chair (including a wheelchair): None  Standing up from a chair using your arms (e.g. wheelchair or bedside chair): None  To walk in hospital room: None  Climbing 3-5 steps with railing: None  Basic Mobility - Total Score: 22      Encounter Problems       Encounter Problems (Active)       Balance       LTG - Patient will demonstrate Intervention to  enhance balance for safe completion of daily activities (Progressing)       Start:  03/11/25            LTG - Patient will maintain balance to allow for safe mobility (Progressing)       Start:  03/11/25               Compromised Skin Integrity       LTG - Patient will be free from infection (Progressing)       Start:  03/11/25               Mobility       LTG - Patient will ambulate community distance with crutches at a modified independent level.   (Progressing)       Start:  03/11/25            LTG - Patient will navigate 4-6 steps with crutches at a modified independent level.   (Progressing)       Start:  03/11/25               PT Transfers       LTG - Patient will demonstrate safe transfer techniques with crutches at a modified independent level.   (Progressing)       Start:  03/11/25               Safety       LTG - Patient will demonstrate safety requirements appropriate to situation/environment (Progressing)       Start:  03/11/25                 Martha Irving, PT, DPT

## 2025-03-12 NOTE — CARE PLAN
The patient's goals for the shift include pain management.    The clinical goals for the shift include pain control / safety

## 2025-03-12 NOTE — PROGRESS NOTES
Medication Education     Medication education for Justina Vela was provided to the patient  for the following medication(s):    APAP  ASA  Celecoxib  Cyclobenzaprine  Vit D  Gabapentin  Ondansetron  Oycodone  Pantoprazole  Polyethylene glycol  Stool softener-laxative      Medication education provided by a Pharmacist:  Dose, frequency, storage How to take and what to do if a dose is missed Proper dose, indication, possible ADRs How the medication works and benefits of taking it    Identified potential barriers to education:  None    Method(s) of Education:  Verbal Written materials provided and reviewed    An opportunity to ask questions and receive answers was provided.     Assessment of understanding the patient :  2= meets goals/outcomes    Additional Notes (if applicable): meds 2 beds delivered to patient    Lamberto StanleyD

## 2025-03-12 NOTE — PROGRESS NOTES
Interdisciplinary Rounds were completed at the bedside with Patient.  Staff participating in rounds included: Clinical Nurse Orthopedic Coordinator Transitional Care Coordinator Nursing Leadership Hospitalist MD/PA.  Topics discussed included: Today's Plan of Care Discharge Plan and Accommodations Physical Therapy Medications/Preferred Pharmacy and the Patient was given the opportunity to ask additional questions or bring up any concerns at that time.  During the final discharge discussion and review of instructions, they will have another opportunity to review questions or concerns prior to leaving our care.  Patient was given information on who to call post-discharge should new questions or concerns arise.      At the time of this discussion, the patient's plan includes:    Discharge Date/Disposition:  Home, Today with, No Services Necessary  Discharge Needs: No Equipment Needs Identified  Medications/Pharmacy: [id number] service utilized for discharge prescriptions through [facility]

## 2025-03-12 NOTE — CARE PLAN
The patient's goals for the shift include pain management.    The clinical goals for the shift include pain control/safe ambulation

## 2025-03-12 NOTE — NURSING NOTE
Assumed care of pt from night shift RN. Pt resting in bed with call light in reach. 2+ pulses and cap refill brisk. Surgical dressing dry and intact. Lung sounds clear bilaterally. Pt states pain is tolerable. Ice pack refreshed. No stated needs.

## 2025-03-12 NOTE — NURSING NOTE
Assumed care of patient. Report received from Flores Vargas RN. Patient resting in bed watching TV. Vital signs stable, no acute distress. Elizalde catheter removed earlier today and has voided since removal. Pain has been well controlled. Surgical dressing to left hip is clean,dry and intact with ice pack in place. No stated needs at this time. Call light is within reach and bed alarm is activated.

## 2025-03-12 NOTE — DISCHARGE SUMMARY
Discharge Diagnosis  Congenital hip dysplasia (HHS-HCC)    Issues Requiring Follow-Up  dysplasia    Test Results Pending At Discharge  Pending Labs       No current pending labs.            Hospital Course   Patient underwent surgery for combined left hip scope/ROLF without complications. Patient was then takent o the PACU in stabe condition. Patient was then transferred to the [facility].  Pain was appropriately controlled. Diet was advanced as tolerated. PT/OT was consulted and recommended home for patient on discharge. Patient had uneventful hospital course. Patient is to follow-up in 2 weeks at scheduled post-op visit.      Pertinent Physical Exam At Time of Discharge      Home Medications     Medication List      START taking these medications     acetaminophen 325 mg tablet; Commonly known as: Tylenol; Take 2 tablets   (650 mg) by mouth every 6 hours.   aspirin 81 mg EC tablet; Take 1 tablet (81 mg) by mouth 2 times a day.   celecoxib 100 mg capsule; Commonly known as: CeleBREX; Take 1 capsule   (100 mg) by mouth once daily for 10 days.   cyclobenzaprine 10 mg tablet; Commonly known as: Flexeril; Take 1 tablet   (10 mg) by mouth 3 times a day as needed for muscle spasms for up to 7   days.   ergocalciferol 1250 mcg (50,000 units) capsule; Commonly known as:   Vitamin D-2; Take 1 capsule (1,250 mcg) by mouth 1 (one) time per week.;   Start taking on: March 16, 2025   gabapentin 300 mg capsule; Commonly known as: Neurontin; Take 1 capsule   (300 mg) by mouth 3 times a day.   ondansetron 4 mg tablet; Commonly known as: Zofran; Take 1 tablet (4 mg)   by mouth every 8 hours if needed for nausea or vomiting.   oxyCODONE 5 mg immediate release tablet; Commonly known as: Roxicodone;   Take 1 tablet (5 mg) by mouth every 4 hours if needed for severe pain (7 -   10) for up to 7 days.   pantoprazole 40 mg EC tablet; Commonly known as: ProtoNix; Take 1 tablet   (40 mg) by mouth once daily in the morning before meals. Do not  crush,   chew, or split.   polyethylene glycol 17 gram packet; Commonly known as: Miralax; Mix 1   packet (17 g) in 8 oz of fluid and drink by mouth once daily for 10 days.   Stool Softener-Laxative 8.6-50 mg tablet; Generic drug:   sennosides-docusate sodium; Take 1 tablet by mouth 2 times a day for 7   days.     STOP taking these medications     albuterol 90 mcg/actuation inhaler   chlorhexidine 0.12 % solution; Commonly known as: Peridex   ibuprofen 200 mg tablet   meloxicam 15 mg tablet; Commonly known as: Mobic       Outpatient Follow-Up  No future appointments.    Octavia Copeland PA-C

## 2025-03-12 NOTE — PROGRESS NOTES
"Justina Vela is a 19 y.o. female on day 2 of admission presenting with Congenital hip dysplasia (HHS-HCC).    POD2 s/p left hip arthroscopy and ROLF with Braulio Hansen and Cecilia.     Subjective   No acute events overnight. States she is doing very well this morning. Denies chest pain and SOB. Denies numbness and tingling in the LLE. Elizalde removed and she ambulated to the bathroom several times.       Objective     Physical Exam  Vitals and nursing note reviewed.   Constitutional:       Appearance: Normal appearance.   Cardiovascular:      Rate and Rhythm: Normal rate and regular rhythm.   Pulmonary:      Effort: Pulmonary effort is normal.   Abdominal:      General: Abdomen is flat.      Palpations: Abdomen is soft.   Neurological:      Mental Status: She is alert.       LLE: dressing c/d/I.   Thigh soft.   Intact ankle DF/PF, EHL/FHL, wiggles toes.   SILT in all distributions.   2+ DP pulse.     Last Recorded Vitals  Blood pressure 112/64, pulse 67, temperature 36.8 °C (98.2 °F), temperature source Temporal, resp. rate 16, height 1.651 m (5' 5\"), weight 65 kg (143 lb 4.8 oz), last menstrual period 02/28/2025, SpO2 98%.  Intake/Output last 3 Shifts:  I/O last 3 completed shifts:  In: 1170 (18 mL/kg) [P.O.:1170]  Out: 5550 (85.4 mL/kg) [Urine:5550 (2.4 mL/kg/hr)]  Weight: 65 kg     Relevant Results      Scheduled medications  acetaminophen, 1,000 mg, oral, q6h  aspirin, 81 mg, oral, BID  celecoxib, 200 mg, oral, Daily  cholecalciferol, 50,000 Units, oral, Daily  gabapentin, 300 mg, oral, TID  losartan, 12.5 mg, oral, Daily  orphenadrine, 100 mg, oral, BID  oxyCODONE, 5 mg, oral, q6h  pantoprazole, 40 mg, oral, Daily before breakfast  polyethylene glycol, 17 g, oral, BID  sennosides, 2 tablet, oral, BID      Continuous medications     PRN medications  PRN medications: melatonin, ondansetron ODT **OR** ondansetron, oxygen  Results for orders placed or performed during the hospital encounter of 03/10/25 (from the past 24 " hours)   CBC   Result Value Ref Range    WBC 8.3 4.4 - 11.3 x10*3/uL    nRBC      RBC 3.03 (L) 4.00 - 5.20 x10*6/uL    Hemoglobin 9.3 (L) 12.0 - 16.0 g/dL    Hematocrit 28.3 (L) 36.0 - 46.0 %    MCV 93 80 - 100 fL    MCH 30.7 26.0 - 34.0 pg    MCHC 32.9 32.0 - 36.0 g/dL    RDW 12.8 11.5 - 14.5 %    Platelets 118 (L) 150 - 450 x10*3/uL   Basic Metabolic Panel   Result Value Ref Range    Glucose 97 74 - 99 mg/dL    Sodium 138 136 - 145 mmol/L    Potassium 4.1 3.5 - 5.3 mmol/L    Chloride 106 98 - 107 mmol/L    Bicarbonate 28 21 - 32 mmol/L    Anion Gap 8 (L) 10 - 20 mmol/L    Urea Nitrogen 9 6 - 23 mg/dL    Creatinine 0.73 0.50 - 1.05 mg/dL    eGFR >90 >60 mL/min/1.73m*2    Calcium 8.4 (L) 8.6 - 10.3 mg/dL                            Assessment/Plan   Assessment & Plan  Congenital hip dysplasia (HHS-HCC)    Tear of acetabular labrum, left, initial encounter    Femoroacetabular impingement of left hip    Hemarthrosis of left hip    Acute postoperative anemia due to expected blood loss    POD2 s/p left hip arthroscopy and ROLF with Braulio Hansen and Cecilia.     Activity: PT/OT  Antibiotics: nathalia-op ancef  Diet: advance as tolerated  Dressing: change prior to DC  DVT Ppx: aspirin 81mg BID   Elizalde: removed  Pain: multimodal  Weight Bearin lbs FFWB  Dispo: likely dc today  Follow Up: in two weeks from DC with HARJEET.                I spent 20 minutes in the professional and overall care of this patient.      Kwadwo Brooks MD

## 2025-03-12 NOTE — PROGRESS NOTES
HOSPITALIST    Patient seen, chart reviewed.  Vital stable.  Patient currently sleeping in bed.  No issues per nursing staff.

## 2025-03-12 NOTE — NURSING NOTE
Patient assisted to restroom with walker and standby assist. Voided 300mL of clear yellow urine. Assisted back to bed and positioned for comfort with new ice pack in place. Medicated with scheduled morphine for pain. No stated needs at this time. Call light is within reach and bed alarm is activated.

## 2025-03-12 NOTE — PROGRESS NOTES
Justina Vela is a 19 y.o. female on day 2 of admission presenting with Congenital hip dysplasia (HHS-HCC).      Subjective   She reports postoperative pain is controlled.  She is tolerating physical therapy.  She denies dizziness, chest pain or shortness of breath.       Objective     Last Recorded Vitals  /74 (BP Location: Left arm, Patient Position: Lying)   Pulse 76   Temp 36.7 °C (98.1 °F) (Temporal)   Resp 16   Wt 65 kg (143 lb 4.8 oz)   SpO2 97%   Intake/Output last 3 Shifts:    Intake/Output Summary (Last 24 hours) at 3/12/2025 1033  Last data filed at 3/12/2025 0900  Gross per 24 hour   Intake 1290 ml   Output 2200 ml   Net -910 ml       Admission Weight  Weight: 65 kg (143 lb 4.8 oz) (03/10/25 0948)    Daily Weight  03/10/25 : 65 kg (143 lb 4.8 oz)    Image Results  FL fluoro images no charge  These images are not reportable by radiology and will not be interpreted   by  Radiologists.      Physical Exam  Constitutional:       Appearance: Normal appearance.   HENT:      Head: Normocephalic and atraumatic.      Nose: Nose normal.      Mouth/Throat:      Mouth: Mucous membranes are moist.      Pharynx: Oropharynx is clear.   Eyes:      Extraocular Movements: Extraocular movements intact.      Conjunctiva/sclera: Conjunctivae normal.      Pupils: Pupils are equal, round, and reactive to light.   Cardiovascular:      Rate and Rhythm: Normal rate and regular rhythm.      Pulses: Normal pulses.      Heart sounds: Normal heart sounds.   Pulmonary:      Effort: Pulmonary effort is normal.      Breath sounds: Normal breath sounds.   Abdominal:      General: Abdomen is flat. Bowel sounds are normal.      Palpations: Abdomen is soft.      Tenderness: There is no abdominal tenderness.   Musculoskeletal:         General: Normal range of motion.      Cervical back: Normal range of motion and neck supple.   Skin:     General: Skin is warm and dry.   Neurological:      General: No focal deficit present.       Mental Status: She is alert and oriented to person, place, and time.   Psychiatric:         Mood and Affect: Mood normal.         Behavior: Behavior normal.         Thought Content: Thought content normal.         Relevant Results               Assessment/Plan                  Assessment & Plan  Congenital hip dysplasia (HHS-HCC)  The patient is doing well postoperatively.  Postoperative plan including DVT prophylaxis, pain medications, physical therapy and Occupational Therapy.  Per orthopedic surgery.  Acute postoperative anemia due to expected blood loss  She is asymptomatic.  There is no indication for blood transfusion.  Hemoglobin levels be monitored.  Tear of acetabular labrum, left, initial encounter  Plan as above  Femoroacetabular impingement of left hip  Plan as above  Hemarthrosis of left hip  Plan as above                Dc Dodd MD

## 2025-03-12 NOTE — NURSING NOTE
Patient resting in bed. Morning labs drawn and sent to the lab. Vital signs stable. Assisted to the restroom with walker and standby assist and voided 500mL of urine. Assisted back to bed and positioned for comfort with ice pack in place. Pain has been well controlled. No stated needs. Call light is within reach and bed alarm is activated.

## 2025-03-12 NOTE — NURSING NOTE
Surgical dressing removed at this time per orders. Mepilax AG placed over surgical incision and mepilax 4x4 applied over laparoscopic site. Pt tolerated well. No increased pain or bleeding at the site. Dressing dry and intact.

## 2025-03-12 NOTE — NURSING NOTE
Reviewed discharge instructions and medications with patient at the bedside. Pt and family verbalized understanding. IV discontinued without incidence. Tip intact and pressure applied. Medications provided by MINOFF. Surgical dressing dry and intact. Pt wheeled down via wheelchair by FANNY Wagner and assisted into vehicle.

## 2025-03-12 NOTE — CARE PLAN
TCC met with patient at the bedside during IDR to discuss care/discharge plan.  Pt POD#2 Left ROLF and left hip arthroscopy.  Plan is home today with no skilled needs.   Post follow up in 2 weeks with Dr. Brooks.  Family to transport home and assist with care as needed.

## 2025-03-12 NOTE — NURSING NOTE
Patient complaining of spontaneous nose bleed. Stopped soon after applying pressure with gauze pads. Patient states she does get frequent nose bleeds with the changing of seasons. Will continue to monitor.

## 2025-03-21 NOTE — OP NOTE
Arthroscopy Hip (L) Operative Note     Date: 3/10/2025  OR Location: [facility]    Name: Justina Vela, : 2005, Age: 20 y.o., MRN: [id number], Sex: female    PREOPERATIVE DIAGNOSIS:   1. 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   2. Acetabular labral tear.   3. Intra-articular loose bodies, one of which required separate   cannula for its removal.   4. Mild hip instability noted on exam under anesthesia.       POSTOPERATIVE DIAGNOSIS:   1. 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   2. Acetabular labral tear.   3. Intra-articular loose bodies, one of which required separate   cannula for its removal.   4. Mild hip instability noted on exam under anesthesia.       OPERATION/PROCEDURE:   1. left hip arthroscopy with arthroscopic rim trim subspinous   decompression as a separate and identifiable procedure for pincer and   subspinous impingement.   2. Acetabular labral repair, 3-anchor looped configuration for a tear   extending from the 12 to 3 o'clock position.   3. Intra-articular loose body removal.   4. Femoral osteochondroplasty for CAM lesion.   5. Capsular plication.       SURGEON:   Silas Hansen MD.       ASSISTANT(S):   First assistant; Octavia Copeland PA-C.  Please note that we will be   billing for my physician's assistant as she was critical and   necessary for successful completion of this case including limb   positioning, anchor placement, suture management, and wound closure.   There were no qualified residents available to assist      TRACTION TIME:   Less than 1 hour.       INDICATION FOR PROCEDURE:   Pleasant patient presented to my office with   persistent left-sided hip pain that had failed conservative   management.  Advanced imaging had revealed a labral tear in the   setting of mixed impingement.  Risks and benefits of surgery have   been discussed with the patient including, but not limited to,   bleeding, infection, damage to nerves or blood vessels, need for    further procedures, risks of anesthesia, blood clots, progression of   osteoarthritis, incomplete pain relief, heterotopic ossification,   pudendal nerve palsy, lateral femoral cutaneous nerve palsy,   avascular necrosis requiring hip replacement.  The patient understood   these risks and wished to proceed with the operative intervention.       PROCEDURE AND FINDINGS:   The patient was identified in the preoperative holding area.   Operative extremity was marked with an indelible marker.  Informed   consent was reviewed.  Patient was taken to the operating room where a   time-out was performed verifying correct site, side, procedure, and   our special equipment.   They were placed supine on the operating room   table.  All bony prominences were well padded.  Patient was then prepped   and draped in usual sterile fashion after anesthesia induced was   without difficulty.  Once the patient was prepped and draped, the hip   was distracted via postless technique.  Standard anterolateral   arthroscopy portal was created.  A modified mid anterior portal   created.  A capsulotomy was performed.  Combination of a shaver and   radiofrequency device used to clean off the superior acetabular rim   identifying the patient's pincer and subspinous impingement, this was   taken down with a bur in the standard fashion as a separate and   identifiable procedure.  We then placed 3 anchors; 1 at 12, 1 at 1, 1   at 3 o'clock; these were shuttled around the labrum, tied down with   alternating half hitches in a loop configuration.  We noted excellent   fixation of labrum.  A few intra-articular loose bodies were removed   Arthroscopically as they were encountered at the time of the operation   and a separate cannula was placed for their removal in order to facilitate  their removal due to size.  Head was reduced.  We noted excellent resolution   of the patient's suction seal.  We identified and protected the   lateral retinacular  vessels throughout the remainder of the case.  An   osteoplasty was then performed from the medial to lateral synovial   fold and proximally and distally to the extent of lesion.  Dynamic   examination revealed no residual impingement.  An x-ray showed good   sphericity on multiple views.  Given the patients laxity noted with distraction   on their exam under anesthesia we then performed a capsular plication   of the interportal capsulotomy with multiple sutures, tying these   down with alternating half hitches and clipping them with the knot.   We drained the hip, withdrew the arthroscope, closed the portals with   interrupted sutures, applied a sterile bandage.  The patient was   awoken from anesthesia without complication, transported to PACU in   stable condition.  Postoperative course will be standard hip   arthroscopy protocol.           Silas Hansen MD

## 2025-03-24 ENCOUNTER — HOSPITAL ENCOUNTER (OUTPATIENT)
Dept: RADIOLOGY | Facility: HOSPITAL | Age: 20
Discharge: HOME | End: 2025-03-24
Payer: COMMERCIAL

## 2025-03-24 ENCOUNTER — OFFICE VISIT (OUTPATIENT)
Dept: ORTHOPEDIC SURGERY | Facility: HOSPITAL | Age: 20
End: 2025-03-24
Payer: COMMERCIAL

## 2025-03-24 ENCOUNTER — APPOINTMENT (OUTPATIENT)
Dept: ORTHOPEDIC SURGERY | Facility: HOSPITAL | Age: 20
End: 2025-03-24
Payer: COMMERCIAL

## 2025-03-24 DIAGNOSIS — Q65.89 HIP DYSPLASIA (HHS-HCC): Primary | ICD-10-CM

## 2025-03-24 DIAGNOSIS — M25.852 FEMOROACETABULAR IMPINGEMENT OF LEFT HIP: ICD-10-CM

## 2025-03-24 DIAGNOSIS — Q65.89 HIP DYSPLASIA (HHS-HCC): ICD-10-CM

## 2025-03-24 PROCEDURE — 72190 X-RAY EXAM OF PELVIS: CPT

## 2025-03-24 PROCEDURE — 1036F TOBACCO NON-USER: CPT | Performed by: SPECIALIST/TECHNOLOGIST

## 2025-03-24 PROCEDURE — 99211 OFF/OP EST MAY X REQ PHY/QHP: CPT | Performed by: SPECIALIST/TECHNOLOGIST

## 2025-03-24 PROCEDURE — 72190 X-RAY EXAM OF PELVIS: CPT | Performed by: RADIOLOGY

## 2025-03-24 NOTE — PROGRESS NOTES
Subjective:  Justina Vela is a 20 y.o. female presenting for follow up from left ROLF/hip scope with Dr. Brooks and Dr. Hansen on 3/10/2025.  Overall doing well. Having mild pain that is is not needing narcotics to control.  He has only been taking Tylenol and continues with her aspirin.  Rates pain a 3-4/10. Surgical incisions are well healing without redness or drainage. Compliant with limited WB.  She is reporting decreased sensation around scope sites and lateral thigh; otherwise denies f/c, n/v, or any other complaints/concerns.     No past medical history on file.    Medication Documentation Review Audit       Reviewed by Mal King ATC () on 25 at 0909      Medication Order Taking? Sig Documenting Provider Last Dose Status   acetaminophen (Tylenol) 325 mg tablet [id number]  Take 2 tablets (650 mg) by mouth every 6 hours. Octavia Copeland PA-C  Active   aspirin 81 mg EC tablet [id number]  Take 1 tablet (81 mg) by mouth 2 times a day. Octavia Copeland PA-C  Active   celecoxib (CeleBREX) 100 mg capsule [id number]  Take 1 capsule (100 mg) by mouth once daily for 10 days. Octavia Copeland PA-C   25 2359   cyclobenzaprine (Flexeril) 10 mg tablet [id number]  Take 1 tablet (10 mg) by mouth 3 times a day as needed for muscle spasms for up to 7 days. Octavia Copeland PA-C   25 2359   ergocalciferol (Vitamin D-2) 1250 mcg (50,000 units) capsule [id number]  Take 1 capsule (1,250 mcg) by mouth 1 (one) time per week. Octavia Copeland PA-C  Active   gabapentin (Neurontin) 300 mg capsule [id number]  Take 1 capsule (300 mg) by mouth 3 times a day. Octavia Copeland PA-C  Active   ondansetron (Zofran) 4 mg tablet [id number]  Take 1 tablet (4 mg) by mouth every 8 hours if needed for nausea or vomiting. Octavia Copeland PA-C  Active   oxyCODONE (Roxicodone) 5 mg immediate release tablet [id number]  Take 1 tablet (5 mg) by mouth every 4 hours if needed for severe pain (7 - 10) for up to 7 days.  Octavia Copeland PA-C   25 235   pantoprazole (ProtoNix) 40 mg EC tablet [id number]  Take 1 tablet (40 mg) by mouth once daily in the morning before meals. Do not crush, chew, or split. Octavia Copeland PA-C  Active   polyethylene glycol (Miralax) 17 gram packet [id number]  Mix 1 packet (17 g) in 8 oz of fluid and drink by mouth once daily for 10 days. Octavia Copeland PA-C   25 235   sennosides-docusate sodium (Senna Plus) 8.6-50 mg tablet [id number]  Take 1 tablet by mouth 2 times a day for 7 days. Octavia Copeland PA-C  Active                    No Known Allergies    Social History     Socioeconomic History    Marital status: Single     Spouse name: Not on file    Number of children: Not on file    Years of education: Not on file    Highest education level: Not on file   Occupational History    Not on file   Tobacco Use    Smoking status: Never     Passive exposure: Never    Smokeless tobacco: Never   Vaping Use    Vaping status: Never Used   Substance and Sexual Activity    Alcohol use: Yes     Comment: avg 8 drinks per month    Drug use: Never    Sexual activity: Defer   Other Topics Concern    Not on file   Social History Narrative    Not on file     Social Drivers of Health     Financial Resource Strain: Low Risk  (3/10/2025)    Overall Financial Resource Strain (CARDIA)     Difficulty of Paying Living Expenses: Not very hard   Food Insecurity: No Food Insecurity (3/10/2025)    Hunger Vital Sign     Worried About Running Out of Food in the Last Year: Never true     Ran Out of Food in the Last Year: Never true   Transportation Needs: No Transportation Needs (3/10/2025)    PRAPARE - Transportation     Lack of Transportation (Medical): No     Lack of Transportation (Non-Medical): No   Physical Activity: Sufficiently Active (3/6/2024)    Received from [facility]    Exercise Vital Sign     Days of Exercise per Week: 7 days     Minutes of Exercise per Session: 90 min   Stress: No Stress  Concern Present (5/11/2023)    Received from [facility]    [employer] - Occupational Stress Questionnaire     Feeling of Stress : Only a little   Social Connections: Unknown (3/6/2024)    Received from [facility]    Social Connection and Isolation Panel [NHANES]     Frequency of Communication with Friends and Family: More than three times a week     Frequency of Social Gatherings with Friends and Family: Twice a week     Attends Christian Services: Never     Active Member of Clubs or Organizations: Yes     Attends Club or Organization Meetings: More than 4 times per year     Marital Status: Not on file   Intimate Partner Violence: Not At Risk (3/10/2025)    Humiliation, Afraid, Rape, and Kick questionnaire     Fear of Current or Ex-Partner: No     Emotionally Abused: No     Physically Abused: No     Sexually Abused: No   Housing Stability: Low Risk  (3/10/2025)    Housing Stability Vital Sign     Unable to Pay for Housing in the Last Year: No     Number of Times Moved in the Last Year: 0     Homeless in the Last Year: No       Past Surgical History:   Procedure Laterality Date    ADENOIDECTOMY  09/23/2015    Adenoidectomy    UPPER GASTROINTESTINAL ENDOSCOPY      WISDOM TOOTH EXTRACTION         ROS: 30 point ROS reviewed and negative other than as listed in the HPI.    Objective:  Exam:  Gen: The pt is A&Ox3, NAD, and appear state age and weight  Psychiatric: mood and affect are appropriate   Eyes: sclera are white, EOM grossly intact  ENT: MMM  Neck: supple, thyroid is midline  Respiratory: respirations are nonlabored, chest rise symmetric  CV: rate is regular by palpation of distal pulses  Abdomen: nondistended   Integument: no obvious cutaneous lesions noted. No signs of lymphangitis. No signs of systemic edema.  MSK: left hip surgical incision well healing without edema, erythema, drainage, or other s/sx of infection. Appropriately tender to palpation around the incision.  SILT throughout the leg intact. Intact plantarflexion and dorsiflexion. Foot warm and well perfused.  Negative Homans.    Image results:  I have personally reviewed multiple views of the pelvis which were obtained in the office today demonstrate maintenance of osteotomy alignment, interval healing and a stable position of the hardware.    Assessment/Plan:  20 y.o. female post-op from left ROLF/Hip scope with Dr. Brooks and Dr. Hansen. Healing well on imaging and exam. Incisions well healing; sutures removed and steri's placed with wound care instructed. Continue ASA for DVT ppx. Continue vit d/calcium protocol. Continue 30lb FFWB with no ROM restrictions; will start therapy for gentle exercises while waiting to advance WB.  She is currently seeing her schools athletic training staff and will continue that through the end of the semester.  We discussed having her also see our physical therapist at [facility] once weekly until she is finished with the semester.  She finishes on 5/10/2025.  Follow up in 5 weeks with WB AP pelvis and judets.

## 2025-04-28 ENCOUNTER — EVALUATION (OUTPATIENT)
Dept: PHYSICAL THERAPY | Facility: CLINIC | Age: 20
End: 2025-04-28
Payer: COMMERCIAL

## 2025-04-28 ENCOUNTER — OFFICE VISIT (OUTPATIENT)
Dept: ORTHOPEDIC SURGERY | Facility: HOSPITAL | Age: 20
End: 2025-04-28
Payer: COMMERCIAL

## 2025-04-28 ENCOUNTER — HOSPITAL ENCOUNTER (OUTPATIENT)
Dept: RADIOLOGY | Facility: HOSPITAL | Age: 20
Discharge: HOME | End: 2025-04-28
Payer: COMMERCIAL

## 2025-04-28 DIAGNOSIS — Z47.89 ORTHOPEDIC AFTERCARE: ICD-10-CM

## 2025-04-28 DIAGNOSIS — Q65.89 HIP DYSPLASIA (HHS-HCC): Primary | ICD-10-CM

## 2025-04-28 DIAGNOSIS — M25.652 HIP STIFFNESS, LEFT: ICD-10-CM

## 2025-04-28 DIAGNOSIS — M25.852 FEMOROACETABULAR IMPINGEMENT OF LEFT HIP: Primary | ICD-10-CM

## 2025-04-28 DIAGNOSIS — Q65.89 HIP DYSPLASIA (HHS-HCC): ICD-10-CM

## 2025-04-28 DIAGNOSIS — R29.898 WEAKNESS OF LEFT HIP: ICD-10-CM

## 2025-04-28 DIAGNOSIS — M25.852 FEMOROACETABULAR IMPINGEMENT OF LEFT HIP: ICD-10-CM

## 2025-04-28 PROCEDURE — 72190 X-RAY EXAM OF PELVIS: CPT

## 2025-04-28 PROCEDURE — 97161 PT EVAL LOW COMPLEX 20 MIN: CPT | Mod: GP

## 2025-04-28 PROCEDURE — 72190 X-RAY EXAM OF PELVIS: CPT | Performed by: RADIOLOGY

## 2025-04-28 PROCEDURE — 99211 OFF/OP EST MAY X REQ PHY/QHP: CPT | Performed by: SPECIALIST/TECHNOLOGIST

## 2025-04-28 PROCEDURE — 1036F TOBACCO NON-USER: CPT | Performed by: SPECIALIST/TECHNOLOGIST

## 2025-04-28 PROCEDURE — 97110 THERAPEUTIC EXERCISES: CPT | Mod: GP

## 2025-04-28 NOTE — PROGRESS NOTES
Physical Therapy  Physical Therapy Orthopedic Evaluation    Patient Name: Justina Vela  MRN: [id number]  Today's Date: 4/28/2025    Insurance:  Visit number: 1 of 50  Authorization info: No  Insurance Type: Payor: TESSIE / Plan: AN[employer]IG HMP / Product Type: *No Product type* /    Current Problem  Problem List Items Addressed This Visit           ICD-10-CM    Femoroacetabular impingement of left hip - Primary M25.852    Relevant Orders    Follow Up In Physical Therapy    Hip stiffness, left M25.652    Relevant Orders    Follow Up In Physical Therapy    Orthopedic aftercare Z47.89    Relevant Orders    Follow Up In Physical Therapy    Weakness of left hip R29.898    Relevant Orders    Follow Up In Physical Therapy     Other Visit Diagnoses         Codes      Hip dysplasia (HHS-HCC)     Q65.89            General:  General  Reason for Referral: L ROLF  Referred By: Narinder Doherty PA-C  General Comment: DOS:3/10/25  Precautions:  Precautions  Precautions Comment: None to PT  Medical History Form: Reviewed (scanned into chart)    Subjective:   MARIO: Pt reports to evaluation 7 weeks post op of a L ROLF. Pt is currently ambulating without any crutches. Pt has some numbness on the outside of her L quad and feels like this has been getting better. Pt has been doing isometrics with her  at school. Pt feels like she is progressing very nicely and had no s/s of DVT or infection after surgery.     Previous Med Management: Injections and ibuprofen prior to surgery     PMH: Nothing related to her hip     Aggravating Factors: Hip rotation, walking longer distances     Relieving Factors: ice     Pain/Symptom Scale:  Current: 2/10  Worst: 2/10  Best: 0/10 when laying down   Location/Nature: Over incision and front of the hip and describes as achy   Meds: ibuprofen as needed     PLOF: Prior to surgery, pt had pain with sitting for longer periods and when sitting   ADL: Pt has not problems besides lower body dressing   Work: Serves  at [employer]    Recreation/ Hobbies: Working out and soccer     Goals: Pt would like to get back to all activities and be pain free     Language: English      Red Flags: Do you have any of the following? No  Fever/chills, unexplained weight changes, dizziness/fainting, unexplained change in bowel or bladder functions, unexplained malaise or muscle weakness, night pain/sweats, numbness or tingling    Objective   Gait: Pt ambulating without crutches with a non antalgic gait pattern    Incisions:  Fully healed. No s/s of infection     L hip PROM (AROM not tested at eval secondary to recent surgery)  Flexion: Able to achieve 100* with no increase in discomfort  Extension: Pt kept at 20* extension due to recent surgery   IR: Pt able to achieve 30* with no increase in discomfort (stayed within precautions)  ER: Pt able to achieve 30* with no increase in discomfort (stayed within precautions)  ABD: Pt able to achieve 30* with no increase in discomfort (stayed within precautions)     R hip PROM  Flexion: 0-130* with no increase in discomfort   IR: 0-45* with no increase in discomfort   ER: 0-45* with no increase in discomfort   ABD: 0-45* with no increase in discomfort     MMT   Flexion- R: 5/5 L: 4/5  Abduction- R: 5/5 L: 4/5  Adduction- R: 5/5 L: 4+/5   Extension - R: 5/5 L: 4/5    Special Tests  Not tested secondary to recent surgery    Sensation intact and equal bilaterally    Outcome Measures:  Other Measures  Lower Extremity Funtional Score (LEFS): 45/80     Treatments:  Access Code: [email or web address]  URL: [email or web address]  Date: 04/28/2025  Prepared by: Minesh Handley    Exercises  - Bent Knee Fallouts  - 1 x daily - 7 x weekly - 3 sets - 10 reps  - Supine Hip Adduction Isometric with Ball  - 1 x daily - 7 x weekly - 3 sets - 10 reps  - Supine Bridge with Belt   - 1 x daily - 7 x weekly - 3 sets - 10 reps  - Hooklying Isometric Clamshell  - 1 x daily - 7 x weekly -  3 sets - 10 reps  - Quadruped Rock Back into Prone Press Up  - 1 x daily - 7 x weekly - 3 sets - 10 reps    EDUCATION: Home exercise program, plan of care, activity modifications, pain management, and injury pathology       Assessment:     Patient is a 20 year old female that presents to physical therapy evaluation today 7 weeks of L ROLF. Patient impairments include flexibility deficits of L hip, strength deficits in gluteal musculature, and motor control deficits including lack of pelvic control. Due to these impairments, the patients has the following functional limitations: pain with pivoting on L leg, difficulty walking longer distances, and an overall decrease in functional mobility. Skilled therapy recommended in order to to address their impairments and progress towards the associated functional goals. Prognosis is excellent secondary to their current presentation and client understanding. The pt demonstrates a good understanding of their current plan of care, rehab expectations, and prognosis.   Surgical pain and pain but that like familiar pain that you had like actually yeah it is pretty better okay you got set up of the right       Plan:     Planned Interventions include: therapeutic exercise, self-care home management, manual therapy, therapeutic activities, gait training, neuromuscular coordination, vasopneumatic, dry needling, aquatic therapy  Frequency: 1-2 x Week  Duration: 12 Weeks  Goals: Set and discussed today  Active       PT Problem       PT Goals       Start:  04/30/25       1. In 4 weeks, pt will demo 4+/5 hip MMT or greater in order to demo an increase in hip strength.      2. In 8 weeks, the pt will be able to begin walk/jog program without any increase in hip discomfort       3. In 6 weeks, the pt will demonstrate PROM hip flexion to 120 degrees, ER to 35 degrees, IR to 30 degrees, and extension to to 10 degrees with no increase in pain at end range       4. In 4 weeks, the pt will  demonstrate full AROM w/o pain at end range for hip flexion, IR/ER, and extension      5. Pt will increase to >65 /80 on the LEFS in order to demonstrate an increase in overall LE functional mobility.                Plan of care was developed with input and agreement by the patient  Ambulatory Screenings Summary       Screening  Frequency  Date Last Completed   Spiritual and Cultural Beliefs   Screening each visit or episode of care 3/10/2025   Falls Risk Screening every ambulatory visit    Pain Screening annually at primary care visit     Domestic Violence screening annually at primary care visit    Depression Screening annually in the primary care setting 3/10/2025   Suicide Risk Screening annually in the primary care setting 3/10/2025   Nutrition and Food Insecurity   Screening at least annually at primary care visit  3/10/2025   Key Learner annually in the primary care setting 3/10/2025   Drug Screen  3/10/2025  9:58 AM   Alcohol Screen  3/10/2025  9:58 AM   Advance Directive       Time Calculation  Start Time: 1530  Stop Time: 1615  Time Calculation (min): 45 min  PT Evaluation Time Entry  PT Evaluation (Low) Time Entry: 25 PT Therapeutic Procedures Time Entry  Therapeutic Exercise Time Entry: 15

## 2025-04-28 NOTE — PROGRESS NOTES
Subjective:  Justina Vela is a 20 y.o. female presenting status post 7 weeks left ROLF/hip scope with Dr. Brooks and Dr. Hansen on 3/10/2025.  Overall doing well. Having mild pain that is is not needing narcotics or over-the-counter medication. Rates pain a 0/10.  She has been off of her crutches for 1 week.  She denies any increased pain.  She does have an occasional muscle soreness and some stiffness that is rectified once she begins to move around.  She begins formal physical therapy at [facility] today.  She presents for continued treatment recommendations.    Medical History[1]    Medication Documentation Review Audit       Reviewed by Mal King ATC () on 25 at 0857      Medication Order Taking? Sig Documenting Provider Last Dose Status   cyclobenzaprine (Flexeril) 10 mg tablet [id number]  Take 1 tablet (10 mg) by mouth 3 times a day as needed for muscle spasms for up to 7 days. Octavia Copeland PA-C   25 235   ergocalciferol (Vitamin D-2) 1250 mcg (50,000 units) capsule [id number]  Take 1 capsule (1,250 mcg) by mouth 1 (one) time per week. Octavia Copeland PA-C  Active   gabapentin (Neurontin) 300 mg capsule [id number]  Take 1 capsule (300 mg) by mouth 3 times a day. Octavia Copeland PA-C   25 235   pantoprazole (ProtoNix) 40 mg EC tablet [id number]  Take 1 tablet (40 mg) by mouth once daily in the morning before meals. Do not crush, chew, or split. Octavia Copeland PA-C   25 2359                    RX Allergies[2]    Social History     Socioeconomic History    Marital status: Single     Spouse name: Not on file    Number of children: Not on file    Years of education: Not on file    Highest education level: Not on file   Occupational History    Not on file   Tobacco Use    Smoking status: Never     Passive exposure: Never    Smokeless tobacco: Never   Vaping Use    Vaping status: Never Used   Substance and Sexual Activity    Alcohol use: Yes      Comment: avg 8 drinks per month    Drug use: Never    Sexual activity: Defer   Other Topics Concern    Not on file   Social History Narrative    Not on file     Social Drivers of Health     Financial Resource Strain: Low Risk  (3/10/2025)    Overall Financial Resource Strain (CARDIA)     Difficulty of Paying Living Expenses: Not very hard   Food Insecurity: No Food Insecurity (3/10/2025)    Hunger Vital Sign     Worried About Running Out of Food in the Last Year: Never true     Ran Out of Food in the Last Year: Never true   Transportation Needs: No Transportation Needs (3/10/2025)    PRAPARE - Transportation     Lack of Transportation (Medical): No     Lack of Transportation (Non-Medical): No   Physical Activity: Sufficiently Active (3/6/2024)    Received from [facility]    Exercise Vital Sign     Days of Exercise per Week: 7 days     Minutes of Exercise per Session: 90 min   Stress: No Stress Concern Present (5/11/2023)    Received from [facility]    [place] [employer] - Occupational Stress Questionnaire     Feeling of Stress : Only a little   Social Connections: Unknown (3/6/2024)    Received from [facility]    Social Connection and Isolation Panel [NHANES]     Frequency of Communication with Friends and Family: More than three times a week     Frequency of Social Gatherings with Friends and Family: Twice a week     Attends Synagogue Services: Never     Active Member of Clubs or Organizations: Yes     Attends Club or Organization Meetings: More than 4 times per year     Marital Status: Not on file   Intimate Partner Violence: Not At Risk (3/10/2025)    Humiliation, Afraid, Rape, and Kick questionnaire     Fear of Current or Ex-Partner: No     Emotionally Abused: No     Physically Abused: No     Sexually Abused: No   Housing Stability: Low Risk  (3/10/2025)    Housing Stability Vital Sign     Unable to Pay for Housing in the Last Year: No     Number of Times Moved in the Last  Year: 0     Homeless in the Last Year: No       Surgical History[3]    ROS: 30 point ROS reviewed and negative other than as listed in the HPI.    Objective:  Exam:  Gen: The pt is A&Ox3, NAD, and appear state age and weight  Psychiatric: mood and affect are appropriate   Eyes: sclera are white, EOM grossly intact  ENT: MMM  Neck: supple, thyroid is midline  Respiratory: respirations are nonlabored, chest rise symmetric  CV: rate is regular by palpation of distal pulses  Abdomen: nondistended   Integument: no obvious cutaneous lesions noted. No signs of lymphangitis. No signs of systemic edema.  MSK: left hip surgical incision well healed without edema, erythema, drainage, or other s/sx of infection. Appropriately tender to palpation around the incision and iliac crest.  SILT throughout the leg intact. Intact plantarflexion and dorsiflexion. Foot warm and well perfused.  Hip internal rotation to 30 degrees, external rotation to 50 degrees.  5/5 manual muscle testing hip flexion, abduction and adduction bilaterally.    Image results:  I have personally reviewed multiple views of the pelvis which were obtained in the office today demonstrate maintenance of osteotomy alignment, interval healing and a stable position of the hardware.      Assessment/Plan:  20 y.o. female post-op from left ROLF/Hip scope with Dr. Brooks and Dr. Hansen. Healing well on imaging and exam.  She begins formal physical therapy twice weekly starting later today.  We discussed the use of over-the-counter analgesia as needed, especially with physical therapy beginning formally.  I encouraged her to continue icing her hip 15 to 20 minutes 2-3 times per day.  She will follow-up in 6 weeks, with Dr. Brooks, with weightbearing AP pelvis and [facility] x-ray films.  She is in agreement the plan.  Her questions have been answered.       [1] No past medical history on file.  [2] No Known Allergies  [3]   Past Surgical History:  Procedure Laterality Date     ADENOIDECTOMY  09/23/2015    Adenoidectomy    UPPER GASTROINTESTINAL ENDOSCOPY      WISDOM TOOTH EXTRACTION

## 2025-04-30 PROBLEM — M25.652 HIP STIFFNESS, LEFT: Status: ACTIVE | Noted: 2025-04-30

## 2025-04-30 PROBLEM — Z47.89 ORTHOPEDIC AFTERCARE: Status: ACTIVE | Noted: 2025-04-30

## 2025-04-30 PROBLEM — R29.898 WEAKNESS OF LEFT HIP: Status: ACTIVE | Noted: 2025-04-30

## 2025-05-01 ENCOUNTER — APPOINTMENT (OUTPATIENT)
Dept: PHYSICAL THERAPY | Facility: CLINIC | Age: 20
End: 2025-05-01
Payer: COMMERCIAL

## 2025-05-01 DIAGNOSIS — M25.652 HIP STIFFNESS, LEFT: ICD-10-CM

## 2025-05-01 DIAGNOSIS — R29.898 WEAKNESS OF LEFT HIP: ICD-10-CM

## 2025-05-01 DIAGNOSIS — M25.852 FEMOROACETABULAR IMPINGEMENT OF LEFT HIP: Primary | ICD-10-CM

## 2025-05-01 DIAGNOSIS — Z47.89 ORTHOPEDIC AFTERCARE: ICD-10-CM

## 2025-05-05 ENCOUNTER — TREATMENT (OUTPATIENT)
Dept: PHYSICAL THERAPY | Facility: CLINIC | Age: 20
End: 2025-05-05
Payer: COMMERCIAL

## 2025-05-05 DIAGNOSIS — R29.898 WEAKNESS OF LEFT HIP: ICD-10-CM

## 2025-05-05 DIAGNOSIS — M25.852 FEMOROACETABULAR IMPINGEMENT OF LEFT HIP: Primary | ICD-10-CM

## 2025-05-05 DIAGNOSIS — Z47.89 ORTHOPEDIC AFTERCARE: ICD-10-CM

## 2025-05-05 DIAGNOSIS — M25.652 HIP STIFFNESS, LEFT: ICD-10-CM

## 2025-05-05 PROCEDURE — 97110 THERAPEUTIC EXERCISES: CPT | Mod: GP

## 2025-05-05 PROCEDURE — 97140 MANUAL THERAPY 1/> REGIONS: CPT | Mod: GP

## 2025-05-05 NOTE — PROGRESS NOTES
Physical Therapy  Physical Therapy Treatment Note    Patient Name: Justina Vela  MRN: [id number]  Today's Date: 5/5/2025  Time Calculation  Start Time: 0830  Stop Time: 0915  Time Calculation (min): 45 min  PT Therapeutic Procedures Time Entry  Manual Therapy Time Entry: 24  Therapeutic Exercise Time Entry: 16        Insurance:  Visit number: 2 of 50  Authorization info: No auth   Insurance Type: Payor: ANTHEM / Plan: ANTHEM HMP / Product Type: *No Product type* /   Current Problem  1. Femoroacetabular impingement of left hip        2. Hip stiffness, left        3. Orthopedic aftercare        4. Weakness of left hip          General  Reason for Referral: L ROLF  Referred By: Narinder Doherty PA-C  General Comment: DOS:3/10/25  Precautions  Precautions  Precautions Comment: None to PT  Subjective:     Patient reports that she continues to be pain free in her L hip. She was dog sitting and was able to take the dog on a little over a mile walk.   Pain     Objective:   Gait: Pt ambulating without crutches with a non antalgic gait pattern     Incisions:  Fully healed. No s/s of infection      L hip PROM (AROM not tested at eval secondary to recent surgery)  Flexion: Able to achieve 100* with no increase in discomfort  Extension: Pt kept at 20* extension due to recent surgery   IR: Pt able to achieve 30* with no increase in discomfort (stayed within precautions)  ER: Pt able to achieve 30* with no increase in discomfort (stayed within precautions)  ABD: Pt able to achieve 30* with no increase in discomfort (stayed within precautions)      R hip PROM  Flexion: 0-130* with no increase in discomfort   IR: 0-45* with no increase in discomfort   ER: 0-45* with no increase in discomfort   ABD: 0-45* with no increase in discomfort      MMT   Flexion- R: 5/5 L: 4/5  Abduction- R: 5/5 L: 4/5  Adduction- R: 5/5 L: 4+/5   Extension - R: 5/5 L: 4/5     Special Tests  Not tested secondary to recent surgery     Sensation intact and equal  bilaterally     Outcome Measures:  Other Measures  Lower Extremity Funtional Score (LEFS): 45/80     Treatments:   [id number]   THERE EX  5 min upright bike   Supine bridge 1 x 10   Supine SL eccentric bridge 2 x 10   Supine clamshell 2 x 10 (green)     MANUAL  STM/DTM to R hip adductor, rectus, hip flexor and posterior/lateral hip  PROM hip flexion, IR and ER (within precautions)   Gentle belted hip mobs         Assessment:  Pt tolerated session well. Pt's ROM is full and pain free. She was able to continue to progress LE strengthening without any increase in hip pain but did verbalized some fatigue. Pt continues to progress towards goals established in the POC and should continue with skilled therapy.       Plan: Continue to progress per protocol.      Goals:  Active       PT Problem       PT Goals       Start:  04/30/25       1. In 4 weeks, pt will demo 4+/5 hip MMT or greater in order to demo an increase in hip strength.      2. In 8 weeks, the pt will be able to begin walk/jog program without any increase in hip discomfort       3. In 6 weeks, the pt will demonstrate PROM hip flexion to 120 degrees, ER to 35 degrees, IR to 30 degrees, and extension to to 10 degrees with no increase in pain at end range       4. In 4 weeks, the pt will demonstrate full AROM w/o pain at end range for hip flexion, IR/ER, and extension      5. Pt will increase to >65 /80 on the LEFS in order to demonstrate an increase in overall LE functional mobility.

## 2025-05-07 ENCOUNTER — TREATMENT (OUTPATIENT)
Dept: PHYSICAL THERAPY | Facility: CLINIC | Age: 20
End: 2025-05-07
Payer: COMMERCIAL

## 2025-05-07 DIAGNOSIS — M25.852 FEMOROACETABULAR IMPINGEMENT OF LEFT HIP: Primary | ICD-10-CM

## 2025-05-07 DIAGNOSIS — Z47.89 ORTHOPEDIC AFTERCARE: ICD-10-CM

## 2025-05-07 DIAGNOSIS — M25.652 HIP STIFFNESS, LEFT: ICD-10-CM

## 2025-05-07 DIAGNOSIS — R29.898 WEAKNESS OF LEFT HIP: ICD-10-CM

## 2025-05-07 PROCEDURE — 97140 MANUAL THERAPY 1/> REGIONS: CPT | Mod: GP

## 2025-05-07 PROCEDURE — 97110 THERAPEUTIC EXERCISES: CPT | Mod: GP

## 2025-05-07 NOTE — PROGRESS NOTES
Physical Therapy  Physical Therapy Treatment Note    Patient Name: Justina Vela  MRN: [id number]  Today's Date: 5/7/2025  Time Calculation  Start Time: 1645  Stop Time: 1730  Time Calculation (min): 45 min  PT Therapeutic Procedures Time Entry  Manual Therapy Time Entry: 25  Therapeutic Exercise Time Entry: 16        Insurance:  Visit number: 3 of 50  Authorization info: No auth   Insurance Type: Payor: ANTHEM / Plan: ANTHEM HMP / Product Type: *No Product type* /   Current Problem  1. Femoroacetabular impingement of left hip        2. Hip stiffness, left        3. Orthopedic aftercare        4. Weakness of left hip          General  Reason for Referral: L ROLF  Referred By: Narinder Doherty PA-C  General Comment: DOS:3/10/25  Precautions  Precautions  Precautions Comment: None to PT  Subjective:     Patient is 8 weeks post op. She has some soreness but no increase in pain.   Pain     Objective:   Gait: Pt ambulating without crutches with a non antalgic gait pattern     Incisions:  Fully healed. No s/s of infection      L hip PROM (AROM not tested at eval secondary to recent surgery)  Flexion: Able to achieve 100* with no increase in discomfort  Extension: Pt kept at 20* extension due to recent surgery   IR: Pt able to achieve 30* with no increase in discomfort (stayed within precautions)  ER: Pt able to achieve 30* with no increase in discomfort (stayed within precautions)  ABD: Pt able to achieve 30* with no increase in discomfort (stayed within precautions)      R hip PROM  Flexion: 0-130* with no increase in discomfort   IR: 0-45* with no increase in discomfort   ER: 0-45* with no increase in discomfort   ABD: 0-45* with no increase in discomfort      MMT   Flexion- R: 5/5 L: 4/5  Abduction- R: 5/5 L: 4/5  Adduction- R: 5/5 L: 4+/5   Extension - R: 5/5 L: 4/5     Special Tests  Not tested secondary to recent surgery     Sensation intact and equal bilaterally     Outcome Measures:  Other Measures  Lower Extremity Funtional  "Score (LEFS): 45/80     Treatments:   [id number]   THERE EX  5 min upright bike   Banded side steps 3 x 40' (yellow)   TRX squats 2 x 10   4\" heel tap 3 x 10     MANUAL  STM/DTM to R hip adductor, rectus, hip flexor and posterior/lateral hip  PROM hip flexion, IR and ER (within precautions)   Gentle belted hip mobs         Assessment:  Pt tolerated session well. Pt was able to begin exercises in weightbearing without any increase in hip discomfort. She was able to perform all exercises through full ROM. Pt continues to progress towards goals established in the POC and should continue with skilled therapy.         Plan: Continue to progress per protocol.      Goals:  Active       PT Problem       PT Goals       Start:  04/30/25       1. In 4 weeks, pt will demo 4+/5 hip MMT or greater in order to demo an increase in hip strength.      2. In 8 weeks, the pt will be able to begin walk/jog program without any increase in hip discomfort       3. In 6 weeks, the pt will demonstrate PROM hip flexion to 120 degrees, ER to 35 degrees, IR to 30 degrees, and extension to to 10 degrees with no increase in pain at end range       4. In 4 weeks, the pt will demonstrate full AROM w/o pain at end range for hip flexion, IR/ER, and extension      5. Pt will increase to >65 /80 on the LEFS in order to demonstrate an increase in overall LE functional mobility.               "

## 2025-05-12 ENCOUNTER — TREATMENT (OUTPATIENT)
Dept: PHYSICAL THERAPY | Facility: CLINIC | Age: 20
End: 2025-05-12
Payer: COMMERCIAL

## 2025-05-12 DIAGNOSIS — M25.852 FEMOROACETABULAR IMPINGEMENT OF LEFT HIP: Primary | ICD-10-CM

## 2025-05-12 DIAGNOSIS — Z47.89 ORTHOPEDIC AFTERCARE: ICD-10-CM

## 2025-05-12 DIAGNOSIS — M25.652 HIP STIFFNESS, LEFT: ICD-10-CM

## 2025-05-12 DIAGNOSIS — R29.898 WEAKNESS OF LEFT HIP: ICD-10-CM

## 2025-05-12 PROCEDURE — 97140 MANUAL THERAPY 1/> REGIONS: CPT | Mod: GP | Performed by: PHYSICAL THERAPIST

## 2025-05-12 PROCEDURE — 97110 THERAPEUTIC EXERCISES: CPT | Mod: GP | Performed by: PHYSICAL THERAPIST

## 2025-05-12 NOTE — PROGRESS NOTES
Physical Therapy  Physical Therapy Treatment Note    Patient Name: Justina Vela  MRN: [id number]  Today's Date: 5/12/2025  Time Calculation  Start Time: 0900  Stop Time: 0938  Time Calculation (min): 38 min  PT Therapeutic Procedures Time Entry  Manual Therapy Time Entry: 23  Therapeutic Exercise Time Entry: 15        Insurance:  Visit number: 4 of 50  Authorization info: No auth   Insurance Type: Payor: ANTHEM / Plan: ANTHEM HMP / Product Type: *No Product type* /   Current Problem  1. Femoroacetabular impingement of left hip        2. Hip stiffness, left        3. Orthopedic aftercare        4. Weakness of left hip          General     Precautions     Subjective:     Patient is 8 weeks post op. She took it easier over the weekend, graduated from [facility] [facility]   Pain     Objective:   Gait: Pt ambulating without crutches with a non antalgic gait pattern     Incisions:  Fully healed. No s/s of infection      L hip PROM (AROM not tested at eval secondary to recent surgery)  Flexion: Able to achieve 100* with no increase in discomfort  Extension: Pt kept at 20* extension due to recent surgery   IR: Pt able to achieve 30* with no increase in discomfort (stayed within precautions)  ER: Pt able to achieve 30* with no increase in discomfort (stayed within precautions)  ABD: Pt able to achieve 30* with no increase in discomfort (stayed within precautions)      R hip PROM  Flexion: 0-130* with no increase in discomfort   IR: 0-45* with no increase in discomfort   ER: 0-45* with no increase in discomfort   ABD: 0-45* with no increase in discomfort      MMT   Flexion- R: 5/5 L: 4/5  Abduction- R: 5/5 L: 4/5  Adduction- R: 5/5 L: 4+/5   Extension - R: 5/5 L: 4/5     Special Tests  Not tested secondary to recent surgery     Sensation intact and equal bilaterally     Outcome Measures:  Other Measures  Lower Extremity Funtional Score (LEFS): 45/80     Treatments:   [id number]   THERE EX  5 min upright bike   Banded side steps 3 x 40' (yellow)  "  TRX squats 3 x 10   4\" heel tap 3 x 10     MANUAL  STM/DTM to R hip adductor, rectus, hip flexor and posterior/lateral hip  PROM hip flexion, IR and ER (within precautions)           Assessment:  Pt tolerated session well. With cuing improved neutral LE alignment during lateral step down, prior to cuing valgus movement at the knee occurred. Pain free ROM with open end feel for hip mobility         Plan: Continue to progress per protocol.      Goals:  Active       PT Problem       PT Goals       Start:  04/30/25       1. In 4 weeks, pt will demo 4+/5 hip MMT or greater in order to demo an increase in hip strength.      2. In 8 weeks, the pt will be able to begin walk/jog program without any increase in hip discomfort       3. In 6 weeks, the pt will demonstrate PROM hip flexion to 120 degrees, ER to 35 degrees, IR to 30 degrees, and extension to to 10 degrees with no increase in pain at end range       4. In 4 weeks, the pt will demonstrate full AROM w/o pain at end range for hip flexion, IR/ER, and extension      5. Pt will increase to >65 /80 on the LEFS in order to demonstrate an increase in overall LE functional mobility.               "

## 2025-05-14 ENCOUNTER — TREATMENT (OUTPATIENT)
Dept: PHYSICAL THERAPY | Facility: CLINIC | Age: 20
End: 2025-05-14
Payer: COMMERCIAL

## 2025-05-14 DIAGNOSIS — M25.652 HIP STIFFNESS, LEFT: ICD-10-CM

## 2025-05-14 DIAGNOSIS — M25.852 FEMOROACETABULAR IMPINGEMENT OF LEFT HIP: Primary | ICD-10-CM

## 2025-05-14 DIAGNOSIS — R29.898 WEAKNESS OF LEFT HIP: ICD-10-CM

## 2025-05-14 DIAGNOSIS — Z47.89 ORTHOPEDIC AFTERCARE: ICD-10-CM

## 2025-05-14 PROCEDURE — 97140 MANUAL THERAPY 1/> REGIONS: CPT | Mod: GP

## 2025-05-14 PROCEDURE — 97110 THERAPEUTIC EXERCISES: CPT | Mod: GP

## 2025-05-14 NOTE — PROGRESS NOTES
Physical Therapy  Physical Therapy Treatment Note    Patient Name: Justina Vela  MRN: [id number]  Today's Date: 5/14/2025  Time Calculation  Start Time: 0930  Stop Time: 1015  Time Calculation (min): 45 min  PT Therapeutic Procedures Time Entry  Manual Therapy Time Entry: 23  Therapeutic Exercise Time Entry: 17        Insurance:  Visit number: 5 of 50  Authorization info: No auth   Insurance Type: Payor: ANTHEM / Plan: ANTHEM HMP / Product Type: *No Product type* /   Current Problem  1. Femoroacetabular impingement of left hip        2. Hip stiffness, left        3. Orthopedic aftercare        4. Weakness of left hip          General  Reason for Referral: HAYES ROLF  Referred By: Narinder Doherty PA-C  General Comment: DOS:3/10/25  Precautions  Precautions  Precautions Comment: None to PT  Subjective:     Patient is 8 weeks post op. She continues to feel good overall with a little soreness in her lateral hip from moving out of her house yesterday   Pain     Objective:   Gait: Pt ambulating without crutches with a non antalgic gait pattern     Incisions:  Fully healed. No s/s of infection      L hip PROM (AROM not tested at eval secondary to recent surgery)  Flexion: Able to achieve 100* with no increase in discomfort  Extension: Pt kept at 20* extension due to recent surgery   IR: Pt able to achieve 30* with no increase in discomfort (stayed within precautions)  ER: Pt able to achieve 30* with no increase in discomfort (stayed within precautions)  ABD: Pt able to achieve 30* with no increase in discomfort (stayed within precautions)      R hip PROM  Flexion: 0-130* with no increase in discomfort   IR: 0-45* with no increase in discomfort   ER: 0-45* with no increase in discomfort   ABD: 0-45* with no increase in discomfort      MMT   Flexion- R: 5/5 L: 4/5  Abduction- R: 5/5 L: 4/5  Adduction- R: 5/5 L: 4+/5   Extension - R: 5/5 L: 4/5     Special Tests  Not tested secondary to recent surgery     Sensation intact and equal  bilaterally     Outcome Measures:  Other Measures  Lower Extremity Funtional Score (LEFS): 45/80     Treatments:   [id number]   THERE EX  5 min upright bike   Adductor slides (red) 2 x 10   Reverse slider lunge 2 x 10   SL stance with med ball toss 3 x 12     MANUAL  STM/DTM to R hip adductor, rectus, hip flexor and posterior/lateral hip  PROM hip flexion, IR and ER (within precautions)   Belted hip mobilizations           Assessment:  Pt tolerated session well. Pt had some discomfort when being brought into end range IR ROM but continues to make good gains. She was able to continue to progress her LE strengthening but verbalized some adductor weakness. Pt continues to progress towards goals established in the POC and should continue with skilled therapy.           Plan: Continue to progress per protocol.      Goals:  Active       PT Problem       PT Goals       Start:  04/30/25       1. In 4 weeks, pt will demo 4+/5 hip MMT or greater in order to demo an increase in hip strength.      2. In 8 weeks, the pt will be able to begin walk/jog program without any increase in hip discomfort       3. In 6 weeks, the pt will demonstrate PROM hip flexion to 120 degrees, ER to 35 degrees, IR to 30 degrees, and extension to to 10 degrees with no increase in pain at end range       4. In 4 weeks, the pt will demonstrate full AROM w/o pain at end range for hip flexion, IR/ER, and extension      5. Pt will increase to >65 /80 on the LEFS in order to demonstrate an increase in overall LE functional mobility.

## 2025-05-19 ENCOUNTER — APPOINTMENT (OUTPATIENT)
Dept: PHYSICAL THERAPY | Facility: CLINIC | Age: 20
End: 2025-05-19
Payer: COMMERCIAL

## 2025-05-19 DIAGNOSIS — M25.652 HIP STIFFNESS, LEFT: ICD-10-CM

## 2025-05-19 DIAGNOSIS — Z47.89 ORTHOPEDIC AFTERCARE: ICD-10-CM

## 2025-05-19 DIAGNOSIS — R29.898 WEAKNESS OF LEFT HIP: ICD-10-CM

## 2025-05-19 DIAGNOSIS — M25.852 FEMOROACETABULAR IMPINGEMENT OF LEFT HIP: Primary | ICD-10-CM

## 2025-05-21 ENCOUNTER — APPOINTMENT (OUTPATIENT)
Dept: PHYSICAL THERAPY | Facility: CLINIC | Age: 20
End: 2025-05-21
Payer: COMMERCIAL

## 2025-05-21 DIAGNOSIS — M25.652 HIP STIFFNESS, LEFT: ICD-10-CM

## 2025-05-21 DIAGNOSIS — R29.898 WEAKNESS OF LEFT HIP: Primary | ICD-10-CM

## 2025-05-21 DIAGNOSIS — Z47.89 ORTHOPEDIC AFTERCARE: ICD-10-CM

## 2025-05-21 DIAGNOSIS — M25.852 FEMOROACETABULAR IMPINGEMENT OF LEFT HIP: ICD-10-CM

## 2025-05-28 ENCOUNTER — APPOINTMENT (OUTPATIENT)
Dept: PHYSICAL THERAPY | Facility: CLINIC | Age: 20
End: 2025-05-28
Payer: COMMERCIAL

## 2025-06-03 ENCOUNTER — TREATMENT (OUTPATIENT)
Dept: PHYSICAL THERAPY | Facility: CLINIC | Age: 20
End: 2025-06-03
Payer: COMMERCIAL

## 2025-06-03 DIAGNOSIS — Z47.89 ORTHOPEDIC AFTERCARE: ICD-10-CM

## 2025-06-03 DIAGNOSIS — M25.852 FEMOROACETABULAR IMPINGEMENT OF LEFT HIP: Primary | ICD-10-CM

## 2025-06-03 DIAGNOSIS — M25.652 HIP STIFFNESS, LEFT: ICD-10-CM

## 2025-06-03 DIAGNOSIS — R29.898 WEAKNESS OF LEFT HIP: ICD-10-CM

## 2025-06-03 PROCEDURE — 97140 MANUAL THERAPY 1/> REGIONS: CPT | Mod: GP

## 2025-06-03 PROCEDURE — 97110 THERAPEUTIC EXERCISES: CPT | Mod: GP

## 2025-06-03 NOTE — PROGRESS NOTES
Physical Therapy  Physical Therapy Treatment Note    Patient Name: Justina Vela  MRN: [id number]  Today's Date: 6/3/2025  Time Calculation  Start Time: 0715  Stop Time: 0800  Time Calculation (min): 45 min  PT Therapeutic Procedures Time Entry  Manual Therapy Time Entry: 15  Therapeutic Exercise Time Entry: 25        Insurance:  Visit number: 6 of 50  Authorization info: No auth   Insurance Type: Payor: ANTHEM / Plan: ANTHEM HMP / Product Type: *No Product type* /   Current Problem  1. Femoroacetabular impingement of left hip        2. Hip stiffness, left        3. Orthopedic aftercare        4. Weakness of left hip          General  Reason for Referral: HAYES STEPHENSO  Referred By: Narinder Doherty PA-C  General Comment: DOS:3/10/25  Precautions  Precautions  Precautions Comment: None to PT  Subjective:     Patient is 12 weeks post op. 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 She had some trouble with her school schedule and scheduling for PT.   Pain     Objective:   Gait: Pt ambulating without crutches with a non antalgic gait pattern     Incisions:  Fully healed. No s/s of infection      L hip PROM (AROM not tested at eval secondary to recent surgery)  Flexion: Able to achieve 100* with no increase in discomfort  Extension: Pt kept at 20* extension due to recent surgery   IR: Pt able to achieve 30* with no increase in discomfort (stayed within precautions)  ER: Pt able to achieve 30* with no increase in discomfort (stayed within precautions)  ABD: Pt able to achieve 30* with no increase in discomfort (stayed within precautions)      R hip PROM  Flexion: 0-130* with no increase in discomfort   IR: 0-45* with no increase in discomfort   ER: 0-45* with no increase in discomfort   ABD: 0-45* with no increase in discomfort      MMT   Flexion- R: 5/5 L: 4/5  Abduction- R: 5/5 L: 4/5  Adduction- R: 5/5 L: 4+/5   Extension - R: 5/5 L: 4/5     Special Tests  Not tested secondary to recent  surgery     Sensation intact and equal bilaterally     Outcome Measures:  Other Measures  Lower Extremity Funtional Score (LEFS): 45/80     Treatments:   [id number]   THERE EX  5 min elliptical   Banded side steps at ankle 3 x 40'   BL leg press (95,115, 140) 3 x 10   BL knee extension (24,36,48#) 3 x 10   BL knee extension (24,36,48#) 3 x 10     MANUAL  STM/DTM to R hip adductor, rectus, hip flexor and posterior/lateral hip  PROM hip flexion, IR and ER (within precautions)   Belted hip mobilizations           Assessment:  Pt tolerated session well. Pt was able to continue to progress LE strengthening without any increase in hip pain. Pt was able to begin leg press, knee extension, and hamstring curl exercises without any problems. Pt continues to progress towards goals established in the POC and should continue with skilled therapy.           Plan: Continue to progress per protocol.      Goals:  Active       PT Problem       PT Goals       Start:  04/30/25       1. In 4 weeks, pt will demo 4+/5 hip MMT or greater in order to demo an increase in hip strength.      2. In 8 weeks, the pt will be able to begin walk/jog program without any increase in hip discomfort       3. In 6 weeks, the pt will demonstrate PROM hip flexion to 120 degrees, ER to 35 degrees, IR to 30 degrees, and extension to to 10 degrees with no increase in pain at end range       4. In 4 weeks, the pt will demonstrate full AROM w/o pain at end range for hip flexion, IR/ER, and extension      5. Pt will increase to >65 /80 on the LEFS in order to demonstrate an increase in overall LE functional mobility.

## 2025-06-18 ENCOUNTER — TREATMENT (OUTPATIENT)
Dept: PHYSICAL THERAPY | Facility: CLINIC | Age: 20
End: 2025-06-18
Payer: COMMERCIAL

## 2025-06-18 DIAGNOSIS — M25.652 HIP STIFFNESS, LEFT: ICD-10-CM

## 2025-06-18 DIAGNOSIS — R29.898 WEAKNESS OF LEFT HIP: ICD-10-CM

## 2025-06-18 DIAGNOSIS — Z47.89 ORTHOPEDIC AFTERCARE: ICD-10-CM

## 2025-06-18 DIAGNOSIS — M25.852 FEMOROACETABULAR IMPINGEMENT OF LEFT HIP: Primary | ICD-10-CM

## 2025-06-18 PROCEDURE — 97110 THERAPEUTIC EXERCISES: CPT | Mod: GP

## 2025-06-18 PROCEDURE — 97140 MANUAL THERAPY 1/> REGIONS: CPT | Mod: GP

## 2025-06-18 NOTE — PROGRESS NOTES
Physical Therapy  Physical Therapy Treatment Note    Patient Name: Justina Vela  MRN: [id number]  Today's Date: 6/18/2025  Time Calculation  Start Time: 1230  Stop Time: 1315  Time Calculation (min): 45 min  PT Therapeutic Procedures Time Entry  Therapeutic Exercise Time Entry: 25  Manual Therapy Time Entry: 15        Insurance:  Visit number: 6 of 50  Authorization info: No auth   Insurance Type: Payor: ANTHEM / Plan: ANTHEM HMP / Product Type: *No Product type* /   Current Problem  1. Femoroacetabular impingement of left hip  Follow Up In Physical Therapy      2. Hip stiffness, left  Follow Up In Physical Therapy      3. Orthopedic aftercare  Follow Up In Physical Therapy      4. Weakness of left hip  Follow Up In Physical Therapy        General  Reason for Referral: HAYES BANSAL  Referred By: Narinder Doherty PA-C  General Comment: DOS:3/10/25  Precautions  Precautions  Precautions Comment: None to PT  Subjective:     Patient is 14 weeks post op. She recently returned from vacation and had no problems with her hip besides some occasional soreness   Pain     Objective:   Gait: Pt ambulating without crutches with a non antalgic gait pattern     Incisions:  Fully healed. No s/s of infection      L hip PROM (AROM not tested at eval secondary to recent surgery)  Flexion: Able to achieve 100* with no increase in discomfort  Extension: Pt kept at 20* extension due to recent surgery   IR: Pt able to achieve 30* with no increase in discomfort (stayed within precautions)  ER: Pt able to achieve 30* with no increase in discomfort (stayed within precautions)  ABD: Pt able to achieve 30* with no increase in discomfort (stayed within precautions)      R hip PROM  Flexion: 0-130* with no increase in discomfort   IR: 0-45* with no increase in discomfort   ER: 0-45* with no increase in discomfort   ABD: 0-45* with no increase in discomfort      MMT   Flexion- R: 5/5 L: 4/5  Abduction- R: 5/5 L: 4/5  Adduction- R: 5/5 L: 4+/5   Extension - R: 5/5  L: 4/5     Special Tests  Not tested secondary to recent surgery     Sensation intact and equal bilaterally     Outcome Measures:  Other Measures  Lower Extremity Funtional Score (LEFS): 45/80     Treatments:   [id number]   THERE EX  5 min elliptical   SL leg press max out 3 x 10 (95,115,140#)  Walk/jog x 10 mins (min 2.5, max 4.5)       MANUAL  STM/DTM to R hip adductor, rectus, hip flexor and posterior/lateral hip  PROM hip flexion, IR and ER (within precautions)   Belted hip mobilizations           Assessment:  Pt tolerated session well. Pt was able to perform her SL max out test without any increase in hip pain. She showed that she was over 90% in strength when comparing surgical vs non surgical. Pt continues to progress towards goals established in the POC and should continue with skilled therapy.             Plan: Continue to progress per protocol.      Goals:  Active       PT Problem       PT Goals       Start:  04/30/25       1. In 4 weeks, pt will demo 4+/5 hip MMT or greater in order to demo an increase in hip strength.      2. In 8 weeks, the pt will be able to begin walk/jog program without any increase in hip discomfort       3. In 6 weeks, the pt will demonstrate PROM hip flexion to 120 degrees, ER to 35 degrees, IR to 30 degrees, and extension to to 10 degrees with no increase in pain at end range       4. In 4 weeks, the pt will demonstrate full AROM w/o pain at end range for hip flexion, IR/ER, and extension      5. Pt will increase to >65 /80 on the LEFS in order to demonstrate an increase in overall LE functional mobility.

## 2025-06-23 ENCOUNTER — TREATMENT (OUTPATIENT)
Dept: PHYSICAL THERAPY | Facility: CLINIC | Age: 20
End: 2025-06-23
Payer: COMMERCIAL

## 2025-06-23 DIAGNOSIS — M25.652 HIP STIFFNESS, LEFT: ICD-10-CM

## 2025-06-23 DIAGNOSIS — R29.898 WEAKNESS OF LEFT HIP: ICD-10-CM

## 2025-06-23 DIAGNOSIS — Z47.89 ORTHOPEDIC AFTERCARE: ICD-10-CM

## 2025-06-23 DIAGNOSIS — M25.852 FEMOROACETABULAR IMPINGEMENT OF LEFT HIP: Primary | ICD-10-CM

## 2025-06-23 PROCEDURE — 97110 THERAPEUTIC EXERCISES: CPT | Mod: GP

## 2025-06-23 PROCEDURE — 97140 MANUAL THERAPY 1/> REGIONS: CPT | Mod: GP

## 2025-06-23 NOTE — PROGRESS NOTES
Physical Therapy  Physical Therapy Treatment Note    Patient Name: Justina Vela  MRN: [id number]  Today's Date: 6/23/2025  Time Calculation  Start Time: 0658  Stop Time: 0741  Time Calculation (min): 43 min  PT Therapeutic Procedures Time Entry  Therapeutic Exercise Time Entry: 25  Manual Therapy Time Entry: 15        Insurance:  Visit number: 7 of 50  Authorization info: No auth   Insurance Type: Payor: ANTHEM / Plan: ANTHEM HMP / Product Type: *No Product type* /   Current Problem  1. Femoroacetabular impingement of left hip        2. Hip stiffness, left        3. Orthopedic aftercare        4. Weakness of left hip            General  Reason for Referral: L ROLF  Referred By: Narinder Dohrety PA-C  General Comment: DOS:3/10/25  Precautions  Precautions  Precautions Comment: None to PT  Subjective:     Patient is 15 weeks post op. She had no increase in hip discomfort after beginning her walk/jog program last week.   Pain     Objective:   Gait: Pt ambulating without crutches with a non antalgic gait pattern     Incisions:  Fully healed. No s/s of infection      L hip PROM (AROM not tested at eval secondary to recent surgery)  Flexion: Able to achieve 100* with no increase in discomfort  Extension: Pt kept at 20* extension due to recent surgery   IR: Pt able to achieve 30* with no increase in discomfort (stayed within precautions)  ER: Pt able to achieve 30* with no increase in discomfort (stayed within precautions)  ABD: Pt able to achieve 30* with no increase in discomfort (stayed within precautions)      R hip PROM  Flexion: 0-130* with no increase in discomfort   IR: 0-45* with no increase in discomfort   ER: 0-45* with no increase in discomfort   ABD: 0-45* with no increase in discomfort      MMT   Flexion- R: 5/5 L: 4/5  Abduction- R: 5/5 L: 4/5  Adduction- R: 5/5 L: 4+/5   Extension - R: 5/5 L: 4/5     Special Tests  Not tested secondary to recent surgery     Sensation intact and equal bilaterally     Outcome  "Measures:  Other Measures  Lower Extremity Funtional Score (LEFS): 45/80     Treatments:   [id number]   THERE EX  5 min elliptical   Banded side steps (green) 3 x 40'   Mini hops on gerstung 3 x 20   12\" box land 3 x 6   12\" box land with rebound jump 3 x 4   Sportcord skater hops 3 x 30 sec       MANUAL  STM/DTM to R hip adductor, rectus, hip flexor and posterior/lateral hip  PROM hip flexion, IR and ER (within precautions)   Belted hip mobilizations           Assessment:  Pt tolerated session well. Pt was able to continue to progress her plyometric exercises without any increase in hip discomfort. Pt did well with proper knee control. Pt continues to progress towards goals established in the POC and should continue with skilled therapy.               Plan: Continue to progress per protocol.      Goals:  Active       PT Problem       PT Goals       Start:  04/30/25       1. In 4 weeks, pt will demo 4+/5 hip MMT or greater in order to demo an increase in hip strength.      2. In 8 weeks, the pt will be able to begin walk/jog program without any increase in hip discomfort       3. In 6 weeks, the pt will demonstrate PROM hip flexion to 120 degrees, ER to 35 degrees, IR to 30 degrees, and extension to to 10 degrees with no increase in pain at end range       4. In 4 weeks, the pt will demonstrate full AROM w/o pain at end range for hip flexion, IR/ER, and extension      5. Pt will increase to >65 /80 on the LEFS in order to demonstrate an increase in overall LE functional mobility.               "

## 2025-06-25 ENCOUNTER — HOSPITAL ENCOUNTER (OUTPATIENT)
Dept: RADIOLOGY | Facility: CLINIC | Age: 20
Discharge: HOME | End: 2025-06-25
Payer: COMMERCIAL

## 2025-06-25 ENCOUNTER — OFFICE VISIT (OUTPATIENT)
Dept: ORTHOPEDIC SURGERY | Facility: CLINIC | Age: 20
End: 2025-06-25
Payer: COMMERCIAL

## 2025-06-25 DIAGNOSIS — Q65.89 HIP DYSPLASIA (HHS-HCC): ICD-10-CM

## 2025-06-25 DIAGNOSIS — Q65.89 HIP DYSPLASIA (HHS-HCC): Primary | ICD-10-CM

## 2025-06-25 PROCEDURE — 72190 X-RAY EXAM OF PELVIS: CPT

## 2025-06-25 PROCEDURE — 99214 OFFICE O/P EST MOD 30 MIN: CPT | Performed by: ORTHOPAEDIC SURGERY

## 2025-06-25 PROCEDURE — 1036F TOBACCO NON-USER: CPT | Performed by: ORTHOPAEDIC SURGERY

## 2025-06-25 PROCEDURE — 99212 OFFICE O/P EST SF 10 MIN: CPT | Performed by: ORTHOPAEDIC SURGERY

## 2025-06-25 NOTE — PROGRESS NOTES
Subjective:  Justina Vela is a 20 y.o. female presenting 3 months status post left ROLF/hip scope with Dr. Brooks and Dr. Hansen on 3/10/2025.  Overall doing well. Having mild pain that is is not needing narcotics or over-the-counter medication. Rates pain a 0/10.  She has been weightbearing as tolerated and started a running protocol.  She denies any increased pain.  She has been doing physical therapy.    Medical History[1]    Medication Documentation Review Audit       Reviewed by Kwadwo Brooks MD (Physician) on 06/25/25 at 1644      Medication Order Taking? Sig Documenting Provider Last Dose Status     Discontinued 06/25/25 1458     Discontinued 06/25/25 1458     Discontinued 06/25/25 1458                    RX Allergies[2]    Social History     Socioeconomic History    Marital status: Single     Spouse name: Not on file    Number of children: Not on file    Years of education: Not on file    Highest education level: Not on file   Occupational History    Not on file   Tobacco Use    Smoking status: Never     Passive exposure: Never    Smokeless tobacco: Never   Vaping Use    Vaping status: Never Used   Substance and Sexual Activity    Alcohol use: Yes     Comment: avg 8 drinks per month    Drug use: Never    Sexual activity: Defer   Other Topics Concern    Not on file   Social History Narrative    Not on file     Social Drivers of Health     Financial Resource Strain: Low Risk  (3/10/2025)    Overall Financial Resource Strain (CARDIA)     Difficulty of Paying Living Expenses: Not very hard   Food Insecurity: No Food Insecurity (3/10/2025)    Hunger Vital Sign     Worried About Running Out of Food in the Last Year: Never true     Ran Out of Food in the Last Year: Never true   Transportation Needs: No Transportation Needs (3/10/2025)    PRAPARE - Transportation     Lack of Transportation (Medical): No     Lack of Transportation (Non-Medical): No   Physical Activity: Sufficiently Active (3/6/2024)    Received from  [facility]    Exercise Vital Sign     Days of Exercise per Week: 7 days     Minutes of Exercise per Session: 90 min   Stress: No Stress Concern Present (5/11/2023)    Received from [facility]    [place] [employer] - Occupational Stress Questionnaire     Feeling of Stress : Only a little   Social Connections: Unknown (3/6/2024)    Received from [facility]    Social Connection and Isolation Panel [NHANES]     Frequency of Communication with Friends and Family: More than three times a week     Frequency of Social Gatherings with Friends and Family: Twice a week     Attends [employer] Services: Never     Active Member of Clubs or Organizations: Yes     Attends Club or Organization Meetings: More than 4 times per year     Marital Status: Not on file   Intimate Partner Violence: Not At Risk (3/10/2025)    Humiliation, Afraid, Rape, and Kick questionnaire     Fear of Current or Ex-Partner: No     Emotionally Abused: No     Physically Abused: No     Sexually Abused: No   Housing Stability: Low Risk  (3/10/2025)    Housing Stability Vital Sign     Unable to Pay for Housing in the Last Year: No     Number of Times Moved in the Last Year: 0     Homeless in the Last Year: No       Surgical History[3]    Objective:  Exam:  Gen: The pt is A&Ox3, NAD, and appear state age and weight  Psychiatric: mood and affect are appropriate   Eyes: sclera are white, EOM grossly intact  ENT: MMM  Neck: supple, thyroid is midline  Respiratory: respirations are nonlabored, chest rise symmetric  CV: rate is regular by palpation of distal pulses  Abdomen: nondistended   Integument: no obvious cutaneous lesions noted. No signs of lymphangitis. No signs of systemic edema.  MSK: left hip surgical incision well healed without edema, erythema, drainage, or other s/sx of infection. Appropriately tender to palpation around the incision and iliac crest.  SILT throughout the leg intact. Intact plantarflexion and  dorsiflexion. Foot warm and well perfused.  Hip internal rotation to 30 degrees, external rotation to 50 degrees.  5/5 manual muscle testing hip flexion, abduction and adduction bilaterally.    Image results:  I have personally reviewed multiple views of the pelvis which were obtained in the office today demonstrate maintenance of osteotomy alignment, interval healing and a stable position of the hardware.      Assessment/Plan:  20 y.o. female 3 months post-op from left ROLF/Hip scope with Dr. Brooks and Dr. Hansen. Healing well on imaging and exam.  Continue physical therapy t per protocol.  We discussed the use of over-the-counter analgesia as needed, especially with physical therapy beginning formally.  I encouraged her to continue icing her hip 15 to 20 minutes 2-3 times per day.  She will follow-up in 3 months with a standing pelvis and Judet pelvic x-rays.  Weightbearing as tolerated, start impact now.  Insert closure       [1] History reviewed. No pertinent past medical history.  [2] No Known Allergies  [3]   Past Surgical History:  Procedure Laterality Date    ADENOIDECTOMY  09/23/2015    Adenoidectomy    UPPER GASTROINTESTINAL ENDOSCOPY      WISDOM TOOTH EXTRACTION

## 2025-06-30 ENCOUNTER — TREATMENT (OUTPATIENT)
Dept: PHYSICAL THERAPY | Facility: CLINIC | Age: 20
End: 2025-06-30
Payer: COMMERCIAL

## 2025-06-30 DIAGNOSIS — Z47.89 ORTHOPEDIC AFTERCARE: ICD-10-CM

## 2025-06-30 DIAGNOSIS — M25.852 FEMOROACETABULAR IMPINGEMENT OF LEFT HIP: ICD-10-CM

## 2025-06-30 DIAGNOSIS — M25.652 HIP STIFFNESS, LEFT: ICD-10-CM

## 2025-06-30 DIAGNOSIS — R29.898 WEAKNESS OF LEFT HIP: ICD-10-CM

## 2025-06-30 PROCEDURE — 97140 MANUAL THERAPY 1/> REGIONS: CPT | Mod: GP

## 2025-06-30 PROCEDURE — 97110 THERAPEUTIC EXERCISES: CPT | Mod: GP

## 2025-06-30 NOTE — PROGRESS NOTES
Physical Therapy  Physical Therapy Treatment Note    Patient Name: Justina Vela  MRN: [id number]  Today's Date: 6/30/2025  Time Calculation  Start Time: 0700  Stop Time: 0745  Time Calculation (min): 45 min  PT Therapeutic Procedures Time Entry  Therapeutic Exercise Time Entry: 25  Manual Therapy Time Entry: 15        Insurance:  Visit number: 8 of 50  Authorization info: No auth   Insurance Type: Payor: ANTHEM / Plan: ANTHEM HMP / Product Type: *No Product type* /   Current Problem  1. Femoroacetabular impingement of left hip  Follow Up In Physical Therapy      2. Hip stiffness, left  Follow Up In Physical Therapy      3. Orthopedic aftercare  Follow Up In Physical Therapy      4. Weakness of left hip  Follow Up In Physical Therapy          General  Reason for Referral: HAYES BANSAL  Referred By: Narinder Doherty PA-C  General Comment: DOS:3/10/25  Precautions  Precautions  Precautions Comment: None to PT  Subjective:     Patient is 16 weeks post op. She has been working her way through her walk/jog program with minimal discomfort and only some soreness.   Pain     Objective:   Gait: Pt ambulating without crutches with a non antalgic gait pattern     Incisions:  Fully healed. No s/s of infection      L hip PROM (AROM not tested at eval secondary to recent surgery)  Flexion: Able to achieve 100* with no increase in discomfort  Extension: Pt kept at 20* extension due to recent surgery   IR: Pt able to achieve 30* with no increase in discomfort (stayed within precautions)  ER: Pt able to achieve 30* with no increase in discomfort (stayed within precautions)  ABD: Pt able to achieve 30* with no increase in discomfort (stayed within precautions)      R hip PROM  Flexion: 0-130* with no increase in discomfort   IR: 0-45* with no increase in discomfort   ER: 0-45* with no increase in discomfort   ABD: 0-45* with no increase in discomfort      MMT   Flexion- R: 5/5 L: 4/5  Abduction- R: 5/5 L: 4/5  Adduction- R: 5/5 L: 4+/5   Extension -  R: 5/5 L: 4/5     Special Tests  Not tested secondary to recent surgery     Sensation intact and equal bilaterally     Outcome Measures:  Other Measures  Lower Extremity Funtional Score (LEFS): 45/80     Treatments:   [id number]   THERE EX  5 min elliptical   Banded side steps (green) 3 x 40'   Mini hops on gerstung 2 x 20    Mini vert with SL land 2 x 10   Sportcord lateral hops 2 x 30 sec   Sportcord diagonal hops 2 x 30 sec   SL sportcord land 2 x 10       MANUAL  STM/DTM to R hip adductor, rectus, hip flexor and posterior/lateral hip  PROM hip flexion, IR and ER (within precautions)   Belted hip mobilizations           Assessment:  Pt tolerated session well. Pt continued to show good form with all plyometric activities. She verbalized that she had no increase in hip pain. Pt continues to progress towards goals established in the POC and should continue with skilled therapy.                 Plan: Continue to progress per protocol.      Goals:  Active       PT Problem       PT Goals       Start:  04/30/25       1. In 4 weeks, pt will demo 4+/5 hip MMT or greater in order to demo an increase in hip strength.      2. In 8 weeks, the pt will be able to begin walk/jog program without any increase in hip discomfort       3. In 6 weeks, the pt will demonstrate PROM hip flexion to 120 degrees, ER to 35 degrees, IR to 30 degrees, and extension to to 10 degrees with no increase in pain at end range       4. In 4 weeks, the pt will demonstrate full AROM w/o pain at end range for hip flexion, IR/ER, and extension      5. Pt will increase to >65 /80 on the LEFS in order to demonstrate an increase in overall LE functional mobility.

## 2025-07-01 ENCOUNTER — APPOINTMENT (OUTPATIENT)
Dept: PHYSICAL THERAPY | Facility: CLINIC | Age: 20
End: 2025-07-01
Payer: COMMERCIAL

## 2025-07-01 DIAGNOSIS — R29.898 WEAKNESS OF LEFT HIP: ICD-10-CM

## 2025-07-01 DIAGNOSIS — M25.852 FEMOROACETABULAR IMPINGEMENT OF LEFT HIP: Primary | ICD-10-CM

## 2025-07-01 DIAGNOSIS — M25.652 HIP STIFFNESS, LEFT: ICD-10-CM

## 2025-07-01 DIAGNOSIS — Z47.89 ORTHOPEDIC AFTERCARE: ICD-10-CM

## 2025-07-08 ENCOUNTER — TREATMENT (OUTPATIENT)
Dept: PHYSICAL THERAPY | Facility: CLINIC | Age: 20
End: 2025-07-08
Payer: COMMERCIAL

## 2025-07-08 DIAGNOSIS — M25.652 HIP STIFFNESS, LEFT: ICD-10-CM

## 2025-07-08 DIAGNOSIS — M25.852 FEMOROACETABULAR IMPINGEMENT OF LEFT HIP: Primary | ICD-10-CM

## 2025-07-08 DIAGNOSIS — Z47.89 ORTHOPEDIC AFTERCARE: ICD-10-CM

## 2025-07-08 DIAGNOSIS — R29.898 WEAKNESS OF LEFT HIP: ICD-10-CM

## 2025-07-08 PROCEDURE — 97140 MANUAL THERAPY 1/> REGIONS: CPT | Mod: GP

## 2025-07-08 PROCEDURE — 97110 THERAPEUTIC EXERCISES: CPT | Mod: GP

## 2025-07-08 NOTE — PROGRESS NOTES
Physical Therapy  Physical Therapy Treatment Note    Patient Name: Justina Vela  MRN: [id number]  Today's Date: 7/8/2025  Time Calculation  Start Time: 0845  Stop Time: 0930  Time Calculation (min): 45 min  PT Therapeutic Procedures Time Entry  Therapeutic Exercise Time Entry: 25  Manual Therapy Time Entry: 15        Insurance:  Visit number: 9 of 50  Authorization info: No auth   Insurance Type: Payor: ANTHEM / Plan: ANTHEM HMP / Product Type: *No Product type* /   Current Problem  1. Femoroacetabular impingement of left hip  Follow Up In Physical Therapy      2. Hip stiffness, left  Follow Up In Physical Therapy      3. Orthopedic aftercare  Follow Up In Physical Therapy      4. Weakness of left hip  Follow Up In Physical Therapy          General  Reason for Referral: HAYES BANSAL  Referred By: Narinder Doherty PA-C  General Comment: DOS:3/10/25  Precautions  Precautions  Precautions Comment: None to PT  Subjective:     Patient is 16 weeks post op. She has been working her way through her walk/jog program with minimal discomfort and only some soreness.   Pain     Objective:   Gait: Pt ambulating without crutches with a non antalgic gait pattern     Incisions:  Fully healed. No s/s of infection      L hip PROM (AROM not tested at eval secondary to recent surgery)  Flexion: Able to achieve 100* with no increase in discomfort  Extension: Pt kept at 20* extension due to recent surgery   IR: Pt able to achieve 30* with no increase in discomfort (stayed within precautions)  ER: Pt able to achieve 30* with no increase in discomfort (stayed within precautions)  ABD: Pt able to achieve 30* with no increase in discomfort (stayed within precautions)      R hip PROM  Flexion: 0-130* with no increase in discomfort   IR: 0-45* with no increase in discomfort   ER: 0-45* with no increase in discomfort   ABD: 0-45* with no increase in discomfort      MMT   Flexion- R: 5/5 L: 4/5  Abduction- R: 5/5 L: 4/5  Adduction- R: 5/5 L: 4+/5   Extension - R:  5/5 L: 4/5     Special Tests  Not tested secondary to recent surgery     Sensation intact and equal bilaterally     Outcome Measures:  Other Measures  Lower Extremity Funtional Score (LEFS): 45/80     Treatments:   [id number]   THERE EX  5 min elliptical   Banded side steps (green) 3 x 40'   Skater hops 2 x 10   SL hop and land (forward, lateral) 2 x 5 each way   Double SL hop 2 x 3 hops       MANUAL  STM/DTM to R hip adductor, rectus, hip flexor and posterior/lateral hip  PROM hip flexion, IR and ER (within precautions)   Belted hip mobilizations           Assessment:  Pt tolerated session well. Pt was able to continue to work on RTS specific exercises without any increase in hip pain. Pt was educated to continue to work on these independently in preparation for RTS test at the end of August. Pt continues to progress towards goals established in the POC and should continue with skilled therapy.                   Plan: Continue to progress per protocol.      Goals:  Active       PT Problem       PT Goals       Start:  04/30/25       1. In 4 weeks, pt will demo 4+/5 hip MMT or greater in order to demo an increase in hip strength.      2. In 8 weeks, the pt will be able to begin walk/jog program without any increase in hip discomfort       3. In 6 weeks, the pt will demonstrate PROM hip flexion to 120 degrees, ER to 35 degrees, IR to 30 degrees, and extension to to 10 degrees with no increase in pain at end range       4. In 4 weeks, the pt will demonstrate full AROM w/o pain at end range for hip flexion, IR/ER, and extension      5. Pt will increase to >65 /80 on the LEFS in order to demonstrate an increase in overall LE functional mobility.

## 2025-07-29 ENCOUNTER — TREATMENT (OUTPATIENT)
Dept: PHYSICAL THERAPY | Facility: CLINIC | Age: 20
End: 2025-07-29
Payer: COMMERCIAL

## 2025-07-29 DIAGNOSIS — R29.898 WEAKNESS OF LEFT HIP: ICD-10-CM

## 2025-07-29 DIAGNOSIS — Z47.89 ORTHOPEDIC AFTERCARE: ICD-10-CM

## 2025-07-29 DIAGNOSIS — M25.852 FEMOROACETABULAR IMPINGEMENT OF LEFT HIP: Primary | ICD-10-CM

## 2025-07-29 DIAGNOSIS — M25.652 HIP STIFFNESS, LEFT: ICD-10-CM

## 2025-07-29 PROCEDURE — 97140 MANUAL THERAPY 1/> REGIONS: CPT | Mod: GP

## 2025-07-29 PROCEDURE — 97110 THERAPEUTIC EXERCISES: CPT | Mod: GP

## 2025-07-29 NOTE — PROGRESS NOTES
Physical Therapy  Physical Therapy Treatment Note    Patient Name: Justina Vela  MRN: [id number]  Today's Date: 7/29/2025  Time Calculation  Start Time: 1230  Stop Time: 1315  Time Calculation (min): 45 min  PT Therapeutic Procedures Time Entry  Therapeutic Exercise Time Entry: 30  Manual Therapy Time Entry: 10        Insurance:  Visit number: 10 of 50  Authorization info: No auth   Insurance Type: Payor: ANTHEM / Plan: ANTHEM HMP / Product Type: *No Product type* /   Current Problem  1. Femoroacetabular impingement of left hip  Follow Up In Physical Therapy      2. Hip stiffness, left  Follow Up In Physical Therapy      3. Orthopedic aftercare  Follow Up In Physical Therapy      4. Weakness of left hip  Follow Up In Physical Therapy          General  Reason for Referral: HAYES BANSAL  Referred By: Narinder Doherty PA-C  General Comment: DOS:3/10/25  Precautions  Precautions  Precautions Comment: None to PT  Subjective:     Patient is 19 weeks post op. She continues to increase her running distance and is not having any increase in pain   Pain     Objective:   Gait: Pt ambulating without crutches with a non antalgic gait pattern     Incisions:  Fully healed. No s/s of infection      L hip PROM (AROM not tested at eval secondary to recent surgery)  Flexion: Able to achieve 100* with no increase in discomfort  Extension: Pt kept at 20* extension due to recent surgery   IR: Pt able to achieve 30* with no increase in discomfort (stayed within precautions)  ER: Pt able to achieve 30* with no increase in discomfort (stayed within precautions)  ABD: Pt able to achieve 30* with no increase in discomfort (stayed within precautions)      R hip PROM  Flexion: 0-130* with no increase in discomfort   IR: 0-45* with no increase in discomfort   ER: 0-45* with no increase in discomfort   ABD: 0-45* with no increase in discomfort      MMT   Flexion- R: 5/5 L: 4/5  Abduction- R: 5/5 L: 4/5  Adduction- R: 5/5 L: 4+/5   Extension - R: 5/5 L: 4/5    "  Special Tests  Not tested secondary to recent surgery     Sensation intact and equal bilaterally     Outcome Measures:  Other Measures  Lower Extremity Funtional Score (LEFS): 45/80     Treatments:   [id number]   THERE EX  5 min elliptical   Banded side steps (green) 3 x 40'   Mini serafin lateral hops 3 x 10   8\" box land with vert hop 2 x 6   8\" box land with lateral hop 2 x 6  Sportcord lateral hops(60BPM) 2 x 45 sec   Sportcord diagonal hops(60BPM) 2 x 45 sec         MANUAL  PROM hip flexion, IR and ER (within precautions)   Belted hip mobilizations           Assessment:  Pt tolerated session well. Pt continues to be pain free and show good form with all RTS exercises. She was educated that we will perform her full RTS testing at the end of August and use the rest of this month to prepare. Pt continues to progress towards goals established in the POC and should continue with skilled therapy.         Plan: Continue to progress per protocol.      Goals:  Active       PT Problem       PT Goals       Start:  04/30/25       1. In 4 weeks, pt will demo 4+/5 hip MMT or greater in order to demo an increase in hip strength.      2. In 8 weeks, the pt will be able to begin walk/jog program without any increase in hip discomfort       3. In 6 weeks, the pt will demonstrate PROM hip flexion to 120 degrees, ER to 35 degrees, IR to 30 degrees, and extension to to 10 degrees with no increase in pain at end range       4. In 4 weeks, the pt will demonstrate full AROM w/o pain at end range for hip flexion, IR/ER, and extension      5. Pt will increase to >65 /80 on the LEFS in order to demonstrate an increase in overall LE functional mobility.               "

## 2025-08-05 ENCOUNTER — TREATMENT (OUTPATIENT)
Dept: PHYSICAL THERAPY | Facility: CLINIC | Age: 20
End: 2025-08-05
Payer: COMMERCIAL

## 2025-08-05 DIAGNOSIS — Z47.89 ORTHOPEDIC AFTERCARE: ICD-10-CM

## 2025-08-05 DIAGNOSIS — R29.898 WEAKNESS OF LEFT HIP: ICD-10-CM

## 2025-08-05 DIAGNOSIS — M25.652 HIP STIFFNESS, LEFT: ICD-10-CM

## 2025-08-05 DIAGNOSIS — M25.852 FEMOROACETABULAR IMPINGEMENT OF LEFT HIP: Primary | ICD-10-CM

## 2025-08-05 PROCEDURE — 97110 THERAPEUTIC EXERCISES: CPT | Mod: GP

## 2025-08-05 PROCEDURE — 97140 MANUAL THERAPY 1/> REGIONS: CPT | Mod: GP

## 2025-08-05 NOTE — PROGRESS NOTES
Physical Therapy  Physical Therapy Treatment Note    Patient Name: Justina Vela  MRN: [id number]  Today's Date: 8/5/2025  Time Calculation  Start Time: 0845  Stop Time: 0930  Time Calculation (min): 45 min  PT Therapeutic Procedures Time Entry  Therapeutic Exercise Time Entry: 24  Manual Therapy Time Entry: 16        Insurance:  Visit number: 11 of 50  Authorization info: No auth   Insurance Type: Payor: [employer] / Plan: [employer] HMP / Product Type: *No Product type* /   Current Problem  1. Femoroacetabular impingement of left hip  Follow Up In Physical Therapy      2. Hip stiffness, left  Follow Up In Physical Therapy      3. Orthopedic aftercare  Follow Up In Physical Therapy      4. Weakness of left hip  Follow Up In Physical Therapy          General  Reason for Referral: [facility]  Referred By: Narinder Doherty PA-C  General Comment: DOS:3/10/25  Precautions  Precautions  Precautions Comment: None to PT  Subjective:     Patient is 20 weeks post op and has had some soreness but no increase in pain with all activities   Pain     Objective:   Gait: Pt ambulating without crutches with a non antalgic gait pattern     Incisions:  Fully healed. No s/s of infection      L hip PROM (AROM not tested at eval secondary to recent surgery)  Flexion: Able to achieve 100* with no increase in discomfort  Extension: Pt kept at 20* extension due to recent surgery   IR: Pt able to achieve 30* with no increase in discomfort (stayed within precautions)  ER: Pt able to achieve 30* with no increase in discomfort (stayed within precautions)  ABD: Pt able to achieve 30* with no increase in discomfort (stayed within precautions)      R hip PROM  Flexion: 0-130* with no increase in discomfort   IR: 0-45* with no increase in discomfort   ER: 0-45* with no increase in discomfort   ABD: 0-45* with no increase in discomfort      MMT   Flexion- R: 5/5 L: 4/5  Abduction- R: 5/5 L: 4/5  Adduction- R: 5/5 L: 4+/5   Extension - R: 5/5 L: 4/5     Special Tests  Not  tested secondary to recent surgery     Sensation intact and equal bilaterally     Outcome Measures:  Other Measures  Lower Extremity Funtional Score (LEFS): 45/80     Treatments:   [id number]   THERE EX  5 min elliptical   PROM hip flexion, IR and ER   Mini hops 2 x 20   SL mini hops 3 x 10   SL single hop 1 x 5   SL triple hop 1 x 3   Crossover triple hop 1 x 3   SL medial and lateral hop 1 x 3 on each       MANUAL  PROM hip flexion, IR and ER (within precautions)   Belted hip mobilizations           Assessment:  Pt tolerated session well. Pt was able to continue to practice SL hop testing without any increase in hip pain and maintained good form with every test. Pt was given a copy of the RTS test to look over before her next appt. Pt continues to progress towards goals established in the POC and should continue with skilled therapy.           Plan: Continue to progress per protocol.      Goals:  Active       PT Problem       PT Goals       Start:  04/30/25       1. In 4 weeks, pt will demo 4+/5 hip MMT or greater in order to demo an increase in hip strength.      2. In 8 weeks, the pt will be able to begin walk/jog program without any increase in hip discomfort       3. In 6 weeks, the pt will demonstrate PROM hip flexion to 120 degrees, ER to 35 degrees, IR to 30 degrees, and extension to to 10 degrees with no increase in pain at end range       4. In 4 weeks, the pt will demonstrate full AROM w/o pain at end range for hip flexion, IR/ER, and extension      5. Pt will increase to >65 /80 on the LEFS in order to demonstrate an increase in overall LE functional mobility.

## 2025-08-26 ENCOUNTER — APPOINTMENT (OUTPATIENT)
Dept: PHYSICAL THERAPY | Facility: CLINIC | Age: 20
End: 2025-08-26
Payer: COMMERCIAL

## 2025-08-26 DIAGNOSIS — M25.652 HIP STIFFNESS, LEFT: ICD-10-CM

## 2025-08-26 DIAGNOSIS — R29.898 WEAKNESS OF LEFT HIP: ICD-10-CM

## 2025-08-26 DIAGNOSIS — M25.852 FEMOROACETABULAR IMPINGEMENT OF LEFT HIP: Primary | ICD-10-CM

## 2025-08-26 DIAGNOSIS — Z47.89 ORTHOPEDIC AFTERCARE: ICD-10-CM

## 2025-08-28 ENCOUNTER — TREATMENT (OUTPATIENT)
Dept: PHYSICAL THERAPY | Facility: CLINIC | Age: 20
End: 2025-08-28
Payer: COMMERCIAL

## 2025-08-28 DIAGNOSIS — M25.852 FEMOROACETABULAR IMPINGEMENT OF LEFT HIP: Primary | ICD-10-CM

## 2025-08-28 DIAGNOSIS — R29.898 WEAKNESS OF LEFT HIP: ICD-10-CM

## 2025-08-28 DIAGNOSIS — M25.652 HIP STIFFNESS, LEFT: ICD-10-CM

## 2025-08-28 DIAGNOSIS — Z47.89 ORTHOPEDIC AFTERCARE: ICD-10-CM

## 2025-08-28 PROCEDURE — 97110 THERAPEUTIC EXERCISES: CPT | Mod: GP

## (undated) DEVICE — MANIFOLD, 4 PORT NEPTUNE STANDARD

## (undated) DEVICE — DRAPE, INSTRUMENT, W/POUCH, STERI DRAPE, 9 5/8 X 18 LONG

## (undated) DEVICE — APPLICATOR, CHLORAPREP, W/ORANGE TINT, 26ML

## (undated) DEVICE — FORCE FIBER P2,  BLUE CO-BRSAID, 38IN STRAND

## (undated) DEVICE — DRAPE COVER, C ARM, FLOUROSCAN IMAGING SYS

## (undated) DEVICE — SAMURAI SLIM, CURVED BLADE, FULL RADIUS

## (undated) DEVICE — WOUND SYSTEM, DEBRIDEMENT & CLEANING, O.R DUOPAK

## (undated) DEVICE — BANDAGE, ELASTIC, SELF-ADHERENT, COBAN, 2 IN X 5 YD, LATEX, TAN, NS

## (undated) DEVICE — 16FR ADVANCE FOLEY TRAY, LUBRI-SIL, DRAINAGE BAG, ANTI-REFLUX CHAMBER, CONTROL-FIT OUTLET TUBE, STATLOCK STABILIZATION DEVICE

## (undated) DEVICE — CONTAINER, SPECIMEN, 4 OZ, OR PEEL PACK, STERILE

## (undated) DEVICE — BLADE, PRECISION 2.0 CARTRIDGE, 25 X 1.27 X 105

## (undated) DEVICE — SUTURE, VICRYL PLUS 2-0, CT-1, 27IN, UNDYED

## (undated) DEVICE — DRILL, ICONIX, 1.4MM, DISPOSABLE

## (undated) DEVICE — SLINGSHOT, 45 DEG UP

## (undated) DEVICE — Device

## (undated) DEVICE — ADAPTER, Y TUBING STERILE

## (undated) DEVICE — ADULT REM POLYHESIVE II PATIENT RETURN ELECTRODE W/9 FT (2.7 M) ATTACHED CORD

## (undated) DEVICE — BUR, SPHERICAL, 4.5MM, PREBENT

## (undated) DEVICE — DRESSING, GAUZE, FLUFF, 1 PLY, 18 X 36 IN

## (undated) DEVICE — GLOVE, SURGICAL, PROTEXIS PI BLUE W/NEUTHERA, 7.0, PF, LF

## (undated) DEVICE — SUTURE, VICRYL, 1, 27 IN, CT-1, VIOLET

## (undated) DEVICE — TAPE, SURGICAL, FOAM, MICROFOAM, HYPOALLERGENIC, 3 IN X 5.5 YD

## (undated) DEVICE — ADVANCED MEDICAL DESIGNS C-ARM PACK, CLIP ON C-ARM DRAPE, 20IN X 20IN FOOT SWITCH DRAPE, 36IN X 36IN SNAP KOVER

## (undated) DEVICE — TUBING, NFLOW, FMS VUE, SNGL USE

## (undated) DEVICE — SUTURE, ETHILON, 3-0, 18 IN, PS1, BLACK

## (undated) DEVICE — ENTRY KIT, PORTAL

## (undated) DEVICE — CANNULA, 7 X 110

## (undated) DEVICE — GOWN, SURGICAL, SMARTGOWN, XLARGE, STERILE

## (undated) DEVICE — CATHETER TRAY, FOLEY, 18FR, 10ML, W/ DRAINBAG

## (undated) DEVICE — TOWEL, SURGICAL, NEURO, O/R, 16 X 26, BLUE, STERILE

## (undated) DEVICE — CANNULA, FLOWPORT II, WITH OBTURATOR

## (undated) DEVICE — ARTHROWAND, MULTIVAC 50XL, ICW

## (undated) DEVICE — TUBING, OUTFLOW, DRAIN PIPE, W/ONE WAY VALVE (FMS VUE)

## (undated) DEVICE — ELECTRODE, ELECTROSURGICAL, BLADE, NONSTICK, MODIFIED, 6.5 IN X 165 MM

## (undated) DEVICE — CATHETER TRAY, SURESTEP, 16FR, URINE METER W/STATLOCK

## (undated) DEVICE — DRESSING, GAUZE, PETROLATUM, PATCH, XEROFORM, 1 X 8 IN, STERILE

## (undated) DEVICE — GLOVE, SURGICAL, PROTEXIS PI MICRO, 6.5, PF, LF

## (undated) DEVICE — SUTURE, MONOCRYL, 3-0, 18 IN, PS2, UNDYED

## (undated) DEVICE — GLOVES, SURG BIOGEL, SZ-8.0, PF, LF

## (undated) DEVICE — TUBING, SUCTION, 6MM X 10, CLEAN N-COND

## (undated) DEVICE — GLOVE, SURGICAL, PROTEXIS PI W/NEU-THERA, 8.5, PF, LF

## (undated) DEVICE — SUTURE, XBRAID P2 COBRA BLACK

## (undated) DEVICE — GLOVE, SURGICAL, PROTEXIS PI MICRO, 8.0, PF, LF

## (undated) DEVICE — SUTURE MANAGER, NANOPASSER, CRESCENT

## (undated) DEVICE — SYRINGE, 60 CC, IRRIGATION, BULB, CONTRO-BULB, PAPER POUCH

## (undated) DEVICE — BLADE, GREAT WHITE CONCAVE, 4.2MM, PREBENT

## (undated) DEVICE — SYRINGE, 10 CC, LUER LOCK

## (undated) DEVICE — C-ARMOR C-ARM DRAPE (FORMERLY CONTOUR FABRICATORS, INC. / CFI MEDICAL SOLUTIONS)

## (undated) DEVICE — SUTURE TAPE, XBRAID, 1.4MM BLACK/WHITE

## (undated) DEVICE — BUR, SPHERICAL, 5.5MM, PREBENT